# Patient Record
Sex: MALE | Race: WHITE | Employment: OTHER | URBAN - METROPOLITAN AREA
[De-identification: names, ages, dates, MRNs, and addresses within clinical notes are randomized per-mention and may not be internally consistent; named-entity substitution may affect disease eponyms.]

---

## 2017-01-17 PROBLEM — E66.9 OBESITY: Status: ACTIVE | Noted: 2017-01-17

## 2017-01-17 PROBLEM — I21.11 ST ELEVATION MYOCARDIAL INFARCTION INVOLVING RIGHT CORONARY ARTERY (HCC): Status: ACTIVE | Noted: 2017-01-17

## 2017-01-17 PROBLEM — I25.119 CORONARY ARTERY DISEASE INVOLVING NATIVE CORONARY ARTERY OF NATIVE HEART WITH ANGINA PECTORIS (HCC): Status: ACTIVE | Noted: 2017-01-17

## 2017-01-17 PROBLEM — I24.9 ACUTE CORONARY SYNDROME (HCC): Status: ACTIVE | Noted: 2017-01-17

## 2017-01-18 ENCOUNTER — TELEPHONE (OUTPATIENT)
Dept: CARDIOLOGY CLINIC | Age: 62
End: 2017-01-18

## 2017-01-20 ENCOUNTER — TELEPHONE (OUTPATIENT)
Dept: CARDIOLOGY CLINIC | Age: 62
End: 2017-01-20

## 2017-11-01 ENCOUNTER — NURSE TRIAGE (OUTPATIENT)
Dept: ADMINISTRATIVE | Age: 62
End: 2017-11-01

## 2019-08-22 ENCOUNTER — HOSPITAL ENCOUNTER (OUTPATIENT)
Dept: VASCULAR LAB | Age: 64
Discharge: HOME | End: 2019-08-22
Payer: MEDICARE

## 2019-08-22 PROCEDURE — 93978 VASCULAR STUDY: CPT

## 2020-08-13 ENCOUNTER — HOSPITAL ENCOUNTER (OUTPATIENT)
Dept: ULTRASOUND IMAGING | Age: 65
Discharge: HOME | End: 2020-08-13
Payer: MEDICAID

## 2020-08-13 PROCEDURE — 76775 US EXAM ABDO BACK WALL LIM: CPT

## 2020-08-29 ENCOUNTER — HOSPITAL ENCOUNTER (OUTPATIENT)
Age: 65
Discharge: HOME | End: 2020-08-29
Payer: MEDICARE

## 2020-08-29 ENCOUNTER — HOSPITAL ENCOUNTER (OUTPATIENT)
Dept: CT IMAGING | Age: 65
Discharge: HOME | End: 2020-08-29
Payer: MEDICARE

## 2020-08-29 LAB
ANION GAP SERPL CALCULATED.3IONS-SCNC: 11 MMOL/L (ref 3–16)
BUN BLDV-MCNC: 24 MG/DL (ref 7–20)
CALCIUM SERPL-MCNC: 10 MG/DL (ref 8.3–10.6)
CHLORIDE BLD-SCNC: 102 MMOL/L (ref 99–110)
CO2: 24 MMOL/L (ref 21–32)
CREAT SERPL-MCNC: 1.2 MG/DL (ref 0.8–1.3)
GFR AFRICAN AMERICAN: >60
GFR NON-AFRICAN AMERICAN: >60
GLUCOSE BLD-MCNC: 143 MG/DL (ref 70–99)
POTASSIUM SERPL-SCNC: 4.3 MMOL/L (ref 3.5–5.1)
SODIUM BLD-SCNC: 137 MMOL/L (ref 136–145)

## 2020-08-29 PROCEDURE — 80048 BASIC METABOLIC PNL TOTAL CA: CPT

## 2020-08-29 PROCEDURE — 74174 CTA ABD&PLVS W/CONTRAST: CPT

## 2020-08-29 PROCEDURE — 36415 COLL VENOUS BLD VENIPUNCTURE: CPT

## 2020-08-29 PROCEDURE — 6360000004 HC RX CONTRAST MEDICATION: Performed by: INTERNAL MEDICINE

## 2020-08-29 RX ADMIN — IOPAMIDOL 85 ML: 755 INJECTION, SOLUTION INTRAVENOUS at 09:48

## 2020-09-21 ENCOUNTER — TELEMEDICINE (OUTPATIENT)
Dept: FAMILY MEDICINE CLINIC | Age: 65
End: 2020-09-21
Payer: MEDICARE

## 2020-09-21 PROBLEM — J44.9 CHRONIC OBSTRUCTIVE PULMONARY DISEASE (HCC): Status: ACTIVE | Noted: 2020-09-21

## 2020-09-21 PROBLEM — I10 ESSENTIAL HYPERTENSION: Status: ACTIVE | Noted: 2020-09-21

## 2020-09-21 PROBLEM — E55.9 VITAMIN D DEFICIENCY: Status: ACTIVE | Noted: 2020-09-21

## 2020-09-21 PROBLEM — I21.11 ST ELEVATION MYOCARDIAL INFARCTION INVOLVING RIGHT CORONARY ARTERY (HCC): Status: RESOLVED | Noted: 2017-01-17 | Resolved: 2020-09-21

## 2020-09-21 PROBLEM — I50.9 CHRONIC CONGESTIVE HEART FAILURE (HCC): Status: ACTIVE | Noted: 2020-09-21

## 2020-09-21 PROBLEM — E11.9 TYPE 2 DIABETES MELLITUS WITHOUT COMPLICATION, WITHOUT LONG-TERM CURRENT USE OF INSULIN (HCC): Status: ACTIVE | Noted: 2020-09-21

## 2020-09-21 PROBLEM — I71.40 ABDOMINAL AORTIC ANEURYSM (AAA) WITHOUT RUPTURE (HCC): Status: ACTIVE | Noted: 2020-09-21

## 2020-09-21 PROBLEM — E78.2 MIXED HYPERLIPIDEMIA: Status: ACTIVE | Noted: 2020-09-21

## 2020-09-21 PROCEDURE — 99203 OFFICE O/P NEW LOW 30 MIN: CPT | Performed by: NURSE PRACTITIONER

## 2020-09-21 RX ORDER — ERGOCALCIFEROL 1.25 MG/1
50000 CAPSULE ORAL WEEKLY
COMMUNITY

## 2020-09-21 RX ORDER — LISINOPRIL 10 MG/1
10 TABLET ORAL DAILY
COMMUNITY

## 2020-09-21 RX ORDER — RIVAROXABAN 2.5 MG/1
2.5 TABLET, FILM COATED ORAL 2 TIMES DAILY
COMMUNITY

## 2020-09-21 RX ORDER — SPIRONOLACTONE 25 MG/1
12.5 TABLET ORAL DAILY
COMMUNITY

## 2020-09-21 RX ORDER — LANCETS 30 GAUGE
1 EACH MISCELLANEOUS DAILY
COMMUNITY

## 2020-09-21 RX ORDER — LOVASTATIN 40 MG/1
20 TABLET ORAL NIGHTLY
COMMUNITY

## 2020-09-21 ASSESSMENT — PATIENT HEALTH QUESTIONNAIRE - PHQ9
2. FEELING DOWN, DEPRESSED OR HOPELESS: 0
SUM OF ALL RESPONSES TO PHQ QUESTIONS 1-9: 0
SUM OF ALL RESPONSES TO PHQ QUESTIONS 1-9: 0
1. LITTLE INTEREST OR PLEASURE IN DOING THINGS: 0
SUM OF ALL RESPONSES TO PHQ9 QUESTIONS 1 & 2: 0

## 2020-09-21 ASSESSMENT — ENCOUNTER SYMPTOMS
VOMITING: 0
COUGH: 0
NAUSEA: 0
SHORTNESS OF BREATH: 0
BACK PAIN: 1
DIARRHEA: 0

## 2020-09-21 NOTE — PROGRESS NOTES
route daily Yes Historical Provider, MD   spironolactone (ALDACTONE) 25 MG tablet Take 12.5 mg by mouth daily Yes Historical Provider, MD   lovastatin (MEVACOR) 40 MG tablet Take 20 mg by mouth nightly Yes Historical Provider, MD   rivaroxaban (XARELTO) 2.5 MG TABS tablet Take 2.5 mg by mouth 2 times daily Yes Historical Provider, MD   aspirin 81 MG chewable tablet Take 1 tablet by mouth daily Yes [place] MD Nina   metoprolol succinate (TOPROL XL) 25 MG extended release tablet Take 1 tablet by mouth daily  Patient taking differently: Take 50 mg by mouth 2 times daily  Yes [place] MD Nina   magnesium oxide (MAG-OX) 400 MG tablet Take 400 mg by mouth daily.  Yes Historical Provider, MD       Social History     Tobacco Use    Smoking status: Current Every Day Smoker     Packs/day: 1.00     Years: 52.00     Pack years: 52.00     Types: Cigarettes    Smokeless tobacco: Never Used   Substance Use Topics    Alcohol use: No    Drug use: No        Allergies   Allergen Reactions    Clopidogrel Bisulfate Other (See Comments)    Niacin Er Itching   ,   Past Medical History:   Diagnosis Date    [facility]congestive heart failure) [facility]     COPD [facility]chronic obstructive pulmonary disease) [facility]     Coronary artery disease involving native coronary artery of native heart with angina pectoris ([facility] 1/17/2017    Diabetes mellitus ([facility]     diet controlled    Hyperlipidemia     Hypertension     Obesity 1/17/2017    [facility]     ST elevation myocardial infarction involving right coronary artery ([facility] 1/17/2017    Tobacco abuse     Ventricular arrhythmia    ,   Past Surgical History:   Procedure Laterality Date    CORONARY ANGIOPLASTY WITH STENT PLACEMENT  2017       PHYSICAL EXAMINATION:  [ INSTRUCTIONS:  \"[x]\" Indicates a positive item  \"[]\" Indicates a negative item  -- DELETE ALL ITEMS NOT EXAMINED]  Vital Signs: (As obtained by patient/caregiver or practitioner observation)    Blood pressure-  Heart rate-    Respiratory rate-    Temperature-  Pulse oximetry-     Constitutional: [x] Appears well-developed and well-nourished [x] No apparent distress      [] Abnormal-   Mental status  [x] Alert and awake  [x] Oriented to person/place/time [x]Able to follow commands      Eyes:  EOM    [x]  Normal  [] Abnormal-  Sclera  [x]  Normal  [] Abnormal -         Discharge []  None visible  [] Abnormal -    HENT:   [x] Normocephalic, atraumatic. [] Abnormal   [] Mouth/Throat: Mucous membranes are moist.     External Ears [] Normal  [] Abnormal-     Neck: [] No visualized mass     Pulmonary/Chest: [x] Respiratory effort normal.  [x] No visualized signs of difficulty breathing or respiratory distress        [] Abnormal-      Musculoskeletal:   [] Normal gait with no signs of ataxia         [] Normal range of motion of neck        [] Abnormal-       Neurological:        [x] No Facial Asymmetry (Cranial nerve 7 motor function) (limited exam to video visit)          [] No gaze palsy        [] Abnormal-         Skin:        [x] No significant exanthematous lesions or discoloration noted on facial skin         [] Abnormal-            Psychiatric:       [x] Normal Affect [] No Hallucinations        [] Abnormal-     Other pertinent observable physical exam findings-     ASSESSMENT/PLAN:  1. Abdominal aortic aneurysm (AAA) without rupture ([address]- refer to vascular  - eLvon Lozano MD, Vascular Surgery, [facility]    2. Type 2 diabetes mellitus without complication, without long-term current use of insulin ([address]- controlled, a1c 6.7 last week. 3. Essential hypertension- reports controlled    4. Mixed hyperlipidemia- on Lovastatin, LDL 89  5. Coronary artery disease involving native coronary artery of native heart with angina pectoris [facility])- follows with cardiology Dr. Jeni Cardenas. Arthuries CP. 6. Chronic obstructive pulmonary disease, unspecified COPD type ([address]- reports controlled    7.  Chronic congestive heart failure, unspecified heart failure type ([facility]- monitors fluid intake, on spironolactone    8. Vitamin D deficiency- on supplemetnt    9. Tobacco abuse- considering quitting, not ready currently. Reviewed risks of smoking and benefits of smoking cessation. Ceci Tatum is a 77 y.o. male being evaluated by a Virtual Visit (video visit) encounter to address concerns as mentioned above. A caregiver was present when appropriate. Due to this being a TeleHealth encounter (During YENEF-69 public health emergency), evaluation of the following organ systems was limited: Vitals/Constitutional/EENT/Resp/CV/GI//MS/Neuro/Skin/Heme-Lymph-Imm. Pursuant to the emergency declaration under the [address] [place] [address], [address] [place] [address] authority and the [employer] Act, this Virtual Visit was conducted with patient's (and/or legal guardian's) consent, to reduce the patient's risk of exposure to COVID-19 and provide necessary medical care. The patient (and/or legal guardian) has also been advised to contact this office for worsening conditions or problems, and seek emergency medical treatment and/or call 911 if deemed necessary. Patient identification was verified at the start of the visit: Yes    Total time spent on this encounter: 30 minutes    Services were provided through a video synchronous discussion virtually to substitute for in-person clinic visit. Patient and provider were located at their individual homes. --ILSA Lowe - CNP on 9/21/2020 at 8:16 AM    An electronic signature was used to authenticate this note.

## 2020-09-21 NOTE — LETTER
[address]  Phone: [phone]  Fax: [phone]    ILSA Cook CNP        September 21, 2020     Patient: Jean Pierre Barron   YOB: 1955   Date of Visit: 9/21/2020       To Whom it May Concern:    Vida Corona was seen in my clinic virtually on 9/21/2020. If you have any questions or concerns, please don't hesitate to call.     Sincerely,         ILSA Cook CNP

## 2020-09-25 ENCOUNTER — OFFICE VISIT (OUTPATIENT)
Dept: VASCULAR SURGERY | Age: 65
End: 2020-09-25
Payer: MEDICARE

## 2020-09-25 VITALS
HEIGHT: 72 IN | SYSTOLIC BLOOD PRESSURE: 130 MMHG | DIASTOLIC BLOOD PRESSURE: 80 MMHG | WEIGHT: 219 LBS | BODY MASS INDEX: 29.66 KG/M2

## 2020-09-25 PROCEDURE — 99203 OFFICE O/P NEW LOW 30 MIN: CPT | Performed by: SURGERY

## 2020-09-25 NOTE — PROGRESS NOTES
Historical Provider, MD   lisinopril (PRINIVIL;ZESTRIL) 10 MG tablet Take 5 mg by mouth daily   Yes Historical Provider, MD   Lancets MISC 1 each by Does not apply route daily   Yes Historical Provider, MD   spironolactone (ALDACTONE) 25 MG tablet Take 12.5 mg by mouth daily   Yes Historical Provider, MD   lovastatin (MEVACOR) 40 MG tablet Take 20 mg by mouth nightly   Yes Historical Provider, MD   rivaroxaban (XARELTO) 2.5 MG TABS tablet Take 2.5 mg by mouth 2 times daily   Yes Historical Provider, MD   aspirin 81 MG chewable tablet Take 1 tablet by mouth daily 1/18/17  Yes Carmelita Alberto MD   metoprolol succinate (TOPROL XL) 25 MG extended release tablet Take 1 tablet by mouth daily  Patient taking differently: Take 50 mg by mouth 2 times daily  1/18/17  Yes Carmelita Alberto MD   magnesium oxide (MAG-OX) 400 MG tablet Take 400 mg by mouth daily. Yes Historical Provider, MD        Allergies:  Clopidogrel bisulfate and Niacin er      Social History:      Social History     Socioeconomic History    Marital status:       Spouse name: Not on file    Number of children: Not on file    Years of education: Not on file    Highest education level: Not on file   Occupational History    Not on file   Social Needs    Financial resource strain: Not on file    Food insecurity     Worry: Not on file     Inability: Not on file    Transportation needs     Medical: Not on file     Non-medical: Not on file   Tobacco Use    Smoking status: Current Every Day Smoker     Packs/day: 1.00     Years: 52.00     Pack years: 52.00     Types: Cigarettes    Smokeless tobacco: Never Used   Substance and Sexual Activity    Alcohol use: No    Drug use: No    Sexual activity: Not on file   Lifestyle    Physical activity     Days per week: Not on file     Minutes per session: Not on file    Stress: Not on file   Relationships    Social connections     Talks on phone: Not on file     Gets together: Not on file     Attends Christian service: Not on file     Active member of club or organization: Not on file     Attends meetings of clubs or organizations: Not on file     Relationship status: Not on file    Intimate partner violence     Fear of current or ex partner: Not on file     Emotionally abused: Not on file     Physically abused: Not on file     Forced sexual activity: Not on file   Other Topics Concern    Not on file   Social History Narrative    Not on file       Family History:        Problem Relation Age of Onset    Breast Cancer Mother     Diabetes Father     Heart Disease Father     High Blood Pressure Father     Kidney Disease Sister     Cancer Brother         bone       Review of Systems:  A 14 point review of systems was completed. Pertinent positives identified in the HPI, all other review of systems negative. Physical Examination:    /80 (Site: Left Upper Arm)   Ht 6' (1.829 m)   Wt 219 lb (99.3 kg)   BMI 29.70 kg/m²     Weight: 219 lb (99.3 kg)       General appearance: NAD  Eyes: PERRLA  Neck: no JVD, no lymphadenopathy. Respiratory: effort is unlabored, no crackles, wheezes or rubs. Cardiovascular: regular, no murmur. No carotid bruits. No edema or varicosities. Abdominal aorta:non palpable  Pulses:    femoral PT   RIGHT 2 2   LEFT 2 2   GI: abdomen soft, nondistended, no organomegaly. Musculoskeletal: strength and tone normal.  Extremities: warm and pink. Skin: no dermatitis or ulceration. Neuro/psychiatric: grossly intact. MEDICAL DECISION MAKING/TESTING        CT: personally reviewed. 5.9cm AAA. Assessment:      Diagnosis Orders   1.  Abdominal aortic aneurysm (AAA) without rupture (HCC)       Candidate for endovascular repair    Recommendations/Plan:  I have  discussed all risks (including but not limited to death, myocardial infarction, hemorrhage, infection, stroke, endoleak, subsequeent aneurysm rupture and renal, mesenteric, lower extremity and spinal cord ischemia), benefits and alternatives to endovascular aneurysm repair. I have specifically emphasized the need for sustained aortic imaging studies for endoleak surveilance. He understands and freely consents. All questions and expectations have been addressed.       Sheri Murphy MD, FACS

## 2020-09-29 ENCOUNTER — PREP FOR PROCEDURE (OUTPATIENT)
Dept: VASCULAR SURGERY | Age: 65
End: 2020-09-29

## 2020-09-30 NOTE — PROGRESS NOTES
Obstructive Sleep Apnea (VICKI) Screening     Patient:  Mikal Meckel    YOB: 1955      Medical Record #:  [id number]                     Date:  9/30/2020     1. Are you a loud and/or regular snorer? []  Yes       [x] No    2. Have you been observed to gasp or stop breathing during sleep? []  Yes       [x] No    3. Do you feel tired or groggy upon awakening or do you awaken with a headache?           []  Yes       [x] No    4. Are you often tired or fatigued during the wake time hours? []  Yes       [x] No    5. Do you fall asleep sitting, reading, watching TV or driving? []  Yes       [x] No    6. Do you often have problems with memory or concentration? []  Yes       [x] No    **If patient's score is ? 3 they are considered high risk for VICKI. An Anesthesia provider will evaluate the patient and develop a plan of care the day of surgery. Note:  If the patient's BMI is more than 35 kg m¯² , has neck circumference > 40 cm, and/or high blood pressure the risk is greater (© American Sleep Apnea Association, 2006).

## 2020-09-30 NOTE — PROGRESS NOTES
Preoperative Screening for Elective Surgery/Invasive Procedures While COVID-19 present in the community     Have you had any of the following symptoms? NONE  o Fever, chills  o Cough  o Shortness of breath  o Muscle aches/pain  o Diarrhea  o Abdominal pain, nausea, vomiting  o Loss or decrease in taste and / or smell   Risk of Exposure  o Have you recently been hospitalized for COVID-19 or flu-like illness, if so when? NO  o Recently diagnosed with COVID-19, if so when? NO  o Recently tested for COVID-19, if so when? NO  o Have you been in close contact with a person or family member who currently has or recently had [phone]? If yes, when and in what context? NO  o Do you live with anybody who in the last 14 days has had fever, chills, shortness of breath, muscle aches, flu-like illness? NO  o Do you have any close contacts or family members who are currently in the hospital for COVID-19 or flu-like illness? If yes, assess recent close contact with this person. NO    Indicate if the patient has a positive screen by answering yes to one or more of the above questions. Patients who test positive or screen positive prior to surgery or on the day of surgery should be evaluated in conjunction with the surgeon/proceduralist/anesthesiologist to determine the urgency of the procedure.

## 2020-10-01 ENCOUNTER — TELEPHONE (OUTPATIENT)
Dept: VASCULAR SURGERY | Age: 65
End: 2020-10-01

## 2020-10-01 NOTE — TELEPHONE ENCOUNTER
Called patient regarding surgery for 9/8/20. Just going over what discussed in office. Patient to arrive at 5;30am for 7:30am surgery. Reminded him to hold Xarelto 48 hours prior to surgery. NPO after midnight.  can

## 2020-10-02 ENCOUNTER — HOSPITAL ENCOUNTER (OUTPATIENT)
Dept: PREADMISSION TESTING | Age: 65
Discharge: HOME | End: 2020-10-06
Payer: MEDICARE

## 2020-10-02 ENCOUNTER — OFFICE VISIT (OUTPATIENT)
Dept: PRIMARY CARE CLINIC | Age: 65
End: 2020-10-02

## 2020-10-02 LAB
ABO/RH: NORMAL
ANION GAP SERPL CALCULATED.3IONS-SCNC: 11 MMOL/L (ref 3–16)
ANTIBODY SCREEN: NORMAL
BASOPHILS ABSOLUTE: 0.1 K/UL (ref 0–0.2)
BASOPHILS RELATIVE PERCENT: 0.6 %
BUN BLDV-MCNC: 21 MG/DL (ref 7–20)
CALCIUM SERPL-MCNC: 9.8 MG/DL (ref 8.3–10.6)
CHLORIDE BLD-SCNC: 101 MMOL/L (ref 99–110)
CO2: 25 MMOL/L (ref 21–32)
CREAT SERPL-MCNC: 1 MG/DL (ref 0.8–1.3)
EKG ATRIAL RATE: 75 BPM
EKG DIAGNOSIS: NORMAL
EKG P AXIS: 7 DEGREES
EKG P-R INTERVAL: 162 MS
EKG Q-T INTERVAL: 406 MS
EKG QRS DURATION: 142 MS
EKG QTC CALCULATION (BAZETT): 453 MS
EKG R AXIS: 244 DEGREES
EKG T AXIS: 3 DEGREES
EKG VENTRICULAR RATE: 75 BPM
EOSINOPHILS ABSOLUTE: 0.3 K/UL (ref 0–0.6)
EOSINOPHILS RELATIVE PERCENT: 2.7 %
GFR AFRICAN AMERICAN: >60
GFR NON-AFRICAN AMERICAN: >60
GLUCOSE BLD-MCNC: 155 MG/DL (ref 70–99)
HCT VFR BLD CALC: 53.1 % (ref 40.5–52.5)
HEMOGLOBIN: 18 G/DL (ref 13.5–17.5)
LYMPHOCYTES ABSOLUTE: 2.7 K/UL (ref 1–5.1)
LYMPHOCYTES RELATIVE PERCENT: 23.1 %
MCH RBC QN AUTO: 29.6 PG (ref 26–34)
MCHC RBC AUTO-ENTMCNC: 34 G/DL (ref 31–36)
MCV RBC AUTO: 87.2 FL (ref 80–100)
MONOCYTES ABSOLUTE: 0.9 K/UL (ref 0–1.3)
MONOCYTES RELATIVE PERCENT: 7.4 %
NEUTROPHILS ABSOLUTE: 7.8 K/UL (ref 1.7–7.7)
NEUTROPHILS RELATIVE PERCENT: 66.2 %
PDW BLD-RTO: 15.4 % (ref 12.4–15.4)
PLATELET # BLD: 237 K/UL (ref 135–450)
PMV BLD AUTO: 8.8 FL (ref 5–10.5)
POTASSIUM SERPL-SCNC: 4.6 MMOL/L (ref 3.5–5.1)
RBC # BLD: 6.09 M/UL (ref 4.2–5.9)
SODIUM BLD-SCNC: 137 MMOL/L (ref 136–145)
WBC # BLD: 11.8 K/UL (ref 4–11)

## 2020-10-02 PROCEDURE — 86900 BLOOD TYPING SEROLOGIC ABO: CPT

## 2020-10-02 PROCEDURE — 36415 COLL VENOUS BLD VENIPUNCTURE: CPT

## 2020-10-02 PROCEDURE — 85025 COMPLETE CBC W/AUTO DIFF WBC: CPT

## 2020-10-02 PROCEDURE — 86850 RBC ANTIBODY SCREEN: CPT

## 2020-10-02 PROCEDURE — 99999 PR OFFICE/OUTPT VISIT,PROCEDURE ONLY: CPT | Performed by: NURSE PRACTITIONER

## 2020-10-02 PROCEDURE — 80048 BASIC METABOLIC PNL TOTAL CA: CPT

## 2020-10-02 PROCEDURE — 86901 BLOOD TYPING SEROLOGIC RH(D): CPT

## 2020-10-02 NOTE — PROGRESS NOTES
Kavon Farr received a viral test for COVID-19. They were educated on isolation and quarantine as appropriate. For any symptoms, they were directed to seek care from their PCP, given contact information to establish with a doctor, directed to an urgent care or the emergency room.

## 2020-10-02 NOTE — PATIENT INSTRUCTIONS

## 2020-10-03 LAB — SARS-COV-2, NAA: NOT DETECTED

## 2020-10-06 ENCOUNTER — ANESTHESIA EVENT (OUTPATIENT)
Dept: OPERATING ROOM | Age: 65
DRG: 269 | End: 2020-10-06
Payer: MEDICARE

## 2020-10-07 ENCOUNTER — TELEPHONE (OUTPATIENT)
Dept: VASCULAR SURGERY | Age: 65
End: 2020-10-07

## 2020-10-07 ASSESSMENT — LIFESTYLE VARIABLES: SMOKING_STATUS: 1

## 2020-10-07 NOTE — ANESTHESIA PRE PROCEDURE
Department of Anesthesiology  Preprocedure Note       Name:  Aldo Macdonald   Age:  77 y.o.  :  1955                                          MRN:  [id number]         Date:  10/8/2020      Surgeon: Janet Nava MD    Procedure: ENDOVASCULAR ABDOMINAL AORTIC ANEURYSM REPAIR WITH GORE ENDOGRAFT    HPI:  77 y.o. male who  has a PMHx of CAD, T2DM, HTN, AAA, and HF. He has a known AAA diagnosed several years ago at 4.7cm. He denies any new abdominal or back pain. Recent ultrasound showed increase in size. A CTA was performed confirming increase to greater than 5.5cm. He does have a history of CAD , S/P PCI with RCA stenting and repeat stenting 2017. He denies any CP or SOB. Seen by Cardiology recently with no interventions or further testing planne    EK-OCT-2020  Normal sinus rhythm; Right bundle branch block; Inferior infarct (cited on or before 2017); Anterolateral infarct (cited on or before 2017). When compared with ECG of 2017: Premature ventricular complexes are no longer Present; Right bundle branch block is now Present    Cardiac Cath: 2017   1.  70% mid RCA stenosis that appeared to be the culprit vessel which is confirmed by fractional flow reserve. There is also moderate disease in the  left coronary circulation. There were previously placed stents in the circ and the right coronary artery that were free of any in-stent restenosis. 2.  Normal left ventricular function, EF 60%. 3.  Slightly depressed hemodynamics.     Medications prior to admission:    vitamin D (ERGOCALCIFEROL) 1.25 MG (98882 UT) Take 50,000 Units by mouth once a week   lisinopril (PRINIVIL;ZESTRIL) 10 MG tablet Take 10 mg by mouth daily    spironolactone (ALDACTONE) 25 MG tablet Take 12.5 mg by mouth daily   lovastatin (MEVACOR) 40 MG tablet Take 20 mg by mouth nightly   rivaroxaban (XARELTO) 2.5 MG TABS tablet Take 2.5 mg by mouth 2 times daily   aspirin 81 MG chewable tablet Take 1 tablet by mouth daily   metoprolol succinate (TOPROL XL) 25 MG Take 50 mg by mouth 2 times daily 10/2/20 per pt daughter - pt taking Metoprolol tartrate (ok per cardiologist per daughter)   magnesium oxide (MAG-OX) 400 MG tablet Take 400 mg by mouth 2 times daily      Allergies:     Clopidogrel Bisulfate Other (See Comments)    Niacin Er Itching     Problem List:     [facility] coronary syndrome ([facility]    Coronary artery disease involving native coronary artery of native heart with angina pectoris ([facility]    Obesity    Type 2 diabetes mellitus without complication, without long-term current use of insulin ([facility]    Abdominal aortic aneurysm (AAA) without rupture ([facility]    Chronic congestive heart failure ([facility]    Chronic obstructive pulmonary disease ([facility]    Vitamin D deficiency    Mixed hyperlipidemia    Essential hypertension     Past Medical History:     AAA (abdominal aortic aneurysm) ([facility]     4.7 cm    CHF (congestive heart failure) ([facility]     CKD (chronic kidney disease)     COPD (chronic obstructive pulmonary disease) ([facility]     Coronary artery disease involving native coronary artery of native heart with angina pectoris ([address] 1/17/2017    Diabetes mellitus ([facility]     diet controlled    Hyperlipidemia     Hypertension     ILD (interstitial lung disease) ([facility]     Obesity 1/17/2017    Peripheral vascular disease ([facility]     ST elevation myocardial infarction involving right coronary artery ([facility] 1/17/2017    Tobacco abuse     Ventricular arrhythmia     Vitamin D deficiency      Past Surgical History:     CORONARY ANGIOPLASTY WITH STENT PLACEMENT  2017     Social History:    Tobacco Use    Smoking status: Current Every Day Smoker     Packs/day: 1.00     Years: 52.00     Pack years: 52.00     Types: Cigarettes    Smokeless tobacco: Never Used   Substance Use Topics    Alcohol use: No     Vital Signs (Current):    BP: 137/80  Pulse: 72    Resp: 16  SpO2: 99    Temp: 97.2 °F (36.2 °C)    Height: 6' (1.829 m)  (10/08/20)  Weight: 217 lb 11.2 oz (98.7 kg)  (10/08/20)    BMI: 29.6            BP Readings from Last 3 Encounters:   09/25/20 130/80   01/18/17 (!) 147/87   01/17/17 95/65     NPO Status: >8 hrs                         BMI:   Wt Readings from Last 3 Encounters:   09/25/20 219 lb (99.3 kg)   01/17/17 229 lb 4.5 oz (104 kg)   01/17/17 230 lb (104.3 kg)       CBC:    WBC 11.8 10/02/2020    HGB 18.0 10/02/2020    HCT 53.1 10/02/2020     10/02/2020     CMP:     10/02/2020    K 4.6 10/02/2020     10/02/2020    CO2 25 10/02/2020    BUN 21 10/02/2020    CREATININE 1.0 10/02/2020    GLUCOSE 155 10/02/2020     COVID-19 Screening (If Applicable):   Component Value Date    COVID19 NOT DETECTED 10/02/2020     Anesthesia Evaluation  Patient summary reviewed and Nursing notes reviewed no history of anesthetic complications:   Airway: Mallampati: II  TM distance: >3 FB   Neck ROM: full  Comment: Full heavy beard  Mouth opening: > = 3 FB Dental:          Pulmonary: breath sounds clear to auscultation  (+) COPD: moderate,  current smoker    (-) asthma, sleep apnea and wheezes                           Cardiovascular:  Exercise tolerance: good (>4 METS),   (+) hypertension:, past MI: > 6 months, CAD: obstructive, CABG/stent: no interval change, BECKMAN: after ambulating 1 flight of stairs, hyperlipidemia    (-)  angina and murmur    ECG reviewed  Rhythm: regular  Rate: normal                    Neuro/Psych:      (-) seizures, TIA and CVA           GI/Hepatic/Renal:   (+) GERD: well controlled,      (-) hepatitis, liver disease and no renal disease       Endo/Other:    (+) DiabetesType II DM, , .    (-) hypothyroidism, arthritis               Abdominal:           Vascular:   + PVD, aortic or cerebral, .  - DVT and PE.       ROS comment: See HPI. Anesthesia Plan      general     ASA 4       Induction: intravenous. arterial line  MIPS: Prophylactic antiemetics administered.   Anesthetic plan and risks discussed with patient. Plan discussed with CRNA.             Linward Bence, MD

## 2020-10-08 ENCOUNTER — HOSPITAL ENCOUNTER (INPATIENT)
Age: 65
LOS: 1 days | Discharge: HOME | DRG: 269 | End: 2020-10-09
Attending: SURGERY | Admitting: SURGERY
Payer: MEDICARE

## 2020-10-08 ENCOUNTER — ANESTHESIA (OUTPATIENT)
Dept: OPERATING ROOM | Age: 65
DRG: 269 | End: 2020-10-08
Payer: MEDICARE

## 2020-10-08 VITALS
TEMPERATURE: 98.1 F | RESPIRATION RATE: 1 BRPM | DIASTOLIC BLOOD PRESSURE: 83 MMHG | SYSTOLIC BLOOD PRESSURE: 117 MMHG | OXYGEN SATURATION: 96 %

## 2020-10-08 PROBLEM — I71.40 AAA (ABDOMINAL AORTIC ANEURYSM): Status: ACTIVE | Noted: 2020-10-08

## 2020-10-08 LAB
ABO/RH: NORMAL
ANION GAP SERPL CALCULATED.3IONS-SCNC: 11 MMOL/L (ref 3–16)
ANTIBODY SCREEN: NORMAL
APTT: 39.5 SEC (ref 24.2–36.2)
BUN BLDV-MCNC: 23 MG/DL (ref 7–20)
CALCIUM SERPL-MCNC: 9.4 MG/DL (ref 8.3–10.6)
CHLORIDE BLD-SCNC: 102 MMOL/L (ref 99–110)
CO2: 23 MMOL/L (ref 21–32)
CREAT SERPL-MCNC: 1 MG/DL (ref 0.8–1.3)
EKG ATRIAL RATE: 69 BPM
EKG DIAGNOSIS: NORMAL
EKG P AXIS: 62 DEGREES
EKG P-R INTERVAL: 180 MS
EKG Q-T INTERVAL: 418 MS
EKG QRS DURATION: 148 MS
EKG QTC CALCULATION (BAZETT): 447 MS
EKG R AXIS: 250 DEGREES
EKG T AXIS: 2 DEGREES
EKG VENTRICULAR RATE: 69 BPM
GFR AFRICAN AMERICAN: >60
GFR NON-AFRICAN AMERICAN: >60
GLUCOSE BLD-MCNC: 105 MG/DL (ref 70–99)
GLUCOSE BLD-MCNC: 140 MG/DL (ref 70–99)
GLUCOSE BLD-MCNC: 151 MG/DL (ref 70–99)
GLUCOSE BLD-MCNC: 159 MG/DL (ref 70–99)
GLUCOSE BLD-MCNC: 166 MG/DL (ref 70–99)
GLUCOSE BLD-MCNC: 184 MG/DL (ref 70–99)
INR BLD: 1.03 (ref 0.86–1.14)
MAGNESIUM: 2 MG/DL (ref 1.8–2.4)
PERFORMED ON: ABNORMAL
POC ACT LR: 240 SEC
POC ACT LR: 249 SEC
POC ACT LR: 270 SEC
POTASSIUM REFLEX MAGNESIUM: 4.5 MMOL/L (ref 3.5–5.1)
PROTHROMBIN TIME: 12 SEC (ref 10–13.2)
SODIUM BLD-SCNC: 136 MMOL/L (ref 136–145)

## 2020-10-08 PROCEDURE — 6360000002 HC RX W HCPCS: Performed by: NURSE ANESTHETIST, CERTIFIED REGISTERED

## 2020-10-08 PROCEDURE — 04V03DZ RESTRICTION OF ABDOMINAL AORTA WITH INTRALUMINAL DEVICE, PERCUTANEOUS APPROACH: ICD-10-PCS | Performed by: SURGERY

## 2020-10-08 PROCEDURE — 6360000002 HC RX W HCPCS: Performed by: SURGERY

## 2020-10-08 PROCEDURE — 7100000001 HC PACU RECOVERY - ADDTL 15 MIN: Performed by: SURGERY

## 2020-10-08 PROCEDURE — 85347 COAGULATION TIME ACTIVATED: CPT

## 2020-10-08 PROCEDURE — 34705 EVAC RPR A-BIILIAC NDGFT: CPT | Performed by: SURGERY

## 2020-10-08 PROCEDURE — 36415 COLL VENOUS BLD VENIPUNCTURE: CPT

## 2020-10-08 PROCEDURE — 3700000000 HC ANESTHESIA ATTENDED CARE: Performed by: SURGERY

## 2020-10-08 PROCEDURE — 86900 BLOOD TYPING SEROLOGIC ABO: CPT

## 2020-10-08 PROCEDURE — 3600000017 HC SURGERY HYBRID ADDL 15MIN: Performed by: SURGERY

## 2020-10-08 PROCEDURE — 7100000000 HC PACU RECOVERY - FIRST 15 MIN: Performed by: SURGERY

## 2020-10-08 PROCEDURE — 2060000000 HC ICU INTERMEDIATE R&B

## 2020-10-08 PROCEDURE — 85730 THROMBOPLASTIN TIME PARTIAL: CPT

## 2020-10-08 PROCEDURE — 6360000002 HC RX W HCPCS: Performed by: ANESTHESIOLOGY

## 2020-10-08 PROCEDURE — 2709999900 HC NON-CHARGEABLE SUPPLY: Performed by: SURGERY

## 2020-10-08 PROCEDURE — 2500000003 HC RX 250 WO HCPCS: Performed by: NURSE ANESTHETIST, CERTIFIED REGISTERED

## 2020-10-08 PROCEDURE — 93010 ELECTROCARDIOGRAM REPORT: CPT | Performed by: INTERNAL MEDICINE

## 2020-10-08 PROCEDURE — 3600000007 HC SURGERY HYBRID BASE: Performed by: SURGERY

## 2020-10-08 PROCEDURE — C1769 GUIDE WIRE: HCPCS | Performed by: SURGERY

## 2020-10-08 PROCEDURE — 6360000004 HC RX CONTRAST MEDICATION: Performed by: SURGERY

## 2020-10-08 PROCEDURE — 3700000001 HC ADD 15 MINUTES (ANESTHESIA): Performed by: SURGERY

## 2020-10-08 PROCEDURE — 86901 BLOOD TYPING SEROLOGIC RH(D): CPT

## 2020-10-08 PROCEDURE — 6370000000 HC RX 637 (ALT 250 FOR IP): Performed by: SURGERY

## 2020-10-08 PROCEDURE — 93005 ELECTROCARDIOGRAM TRACING: CPT | Performed by: SURGERY

## 2020-10-08 PROCEDURE — 2580000003 HC RX 258: Performed by: SURGERY

## 2020-10-08 PROCEDURE — C1894 INTRO/SHEATH, NON-LASER: HCPCS | Performed by: SURGERY

## 2020-10-08 PROCEDURE — 83735 ASSAY OF MAGNESIUM: CPT

## 2020-10-08 PROCEDURE — B41F1ZZ FLUOROSCOPY OF RIGHT LOWER EXTREMITY ARTERIES USING LOW OSMOLAR CONTRAST: ICD-10-PCS | Performed by: SURGERY

## 2020-10-08 PROCEDURE — C1760 CLOSURE DEV, VASC: HCPCS | Performed by: SURGERY

## 2020-10-08 PROCEDURE — 80048 BASIC METABOLIC PNL TOTAL CA: CPT

## 2020-10-08 PROCEDURE — 86850 RBC ANTIBODY SCREEN: CPT

## 2020-10-08 PROCEDURE — 2780000010 HC IMPLANT OTHER: Performed by: SURGERY

## 2020-10-08 PROCEDURE — 2580000003 HC RX 258: Performed by: ANESTHESIOLOGY

## 2020-10-08 PROCEDURE — 2580000003 HC RX 258: Performed by: NURSE ANESTHETIST, CERTIFIED REGISTERED

## 2020-10-08 PROCEDURE — C2628 CATHETER, OCCLUSION: HCPCS | Performed by: SURGERY

## 2020-10-08 PROCEDURE — 85610 PROTHROMBIN TIME: CPT

## 2020-10-08 PROCEDURE — C1887 CATHETER, GUIDING: HCPCS | Performed by: SURGERY

## 2020-10-08 PROCEDURE — 34713 PERQ ACCESS & CLSR FEM ART: CPT | Performed by: SURGERY

## 2020-10-08 DEVICE — IMPLANTABLE DEVICE: Type: IMPLANTABLE DEVICE | Site: ABDOMEN | Status: FUNCTIONAL

## 2020-10-08 DEVICE — GRAFT ENDOPROS L14CM DST DIA12MM CONTRALATERAL LEG W/ C3: Type: IMPLANTABLE DEVICE | Site: ABDOMEN | Status: FUNCTIONAL

## 2020-10-08 DEVICE — PERCLOSE PROGLIDE™ SUTURE-MEDIATED CLOSURE SYSTEM
Type: IMPLANTABLE DEVICE | Site: ABDOMEN | Status: FUNCTIONAL
Brand: PERCLOSE PROGLIDE™

## 2020-10-08 DEVICE — GRAFT EVAR L13.5CM DST DIA20MM FEP NIT CONTRALATERAL LEG IL: Type: IMPLANTABLE DEVICE | Site: ABDOMEN | Status: FUNCTIONAL

## 2020-10-08 RX ORDER — HYDROCODONE BITARTRATE AND ACETAMINOPHEN 5; 325 MG/1; MG/1
1 TABLET ORAL EVERY 4 HOURS PRN
Status: DISCONTINUED | OUTPATIENT
Start: 2020-10-08 | End: 2020-10-09 | Stop reason: HOSPADM

## 2020-10-08 RX ORDER — HYDRALAZINE HYDROCHLORIDE 20 MG/ML
5 INJECTION INTRAMUSCULAR; INTRAVENOUS EVERY 10 MIN PRN
Status: DISCONTINUED | OUTPATIENT
Start: 2020-10-08 | End: 2020-10-08

## 2020-10-08 RX ORDER — ATORVASTATIN CALCIUM 10 MG/1
10 TABLET, FILM COATED ORAL DAILY
Status: DISCONTINUED | OUTPATIENT
Start: 2020-10-08 | End: 2020-10-09 | Stop reason: HOSPADM

## 2020-10-08 RX ORDER — LIDOCAINE HYDROCHLORIDE 10 MG/ML
2 INJECTION, SOLUTION INFILTRATION; PERINEURAL
Status: DISCONTINUED | OUTPATIENT
Start: 2020-10-08 | End: 2020-10-08

## 2020-10-08 RX ORDER — LIDOCAINE HYDROCHLORIDE 20 MG/ML
INJECTION, SOLUTION INFILTRATION; PERINEURAL PRN
Status: DISCONTINUED | OUTPATIENT
Start: 2020-10-08 | End: 2020-10-08 | Stop reason: SDUPTHER

## 2020-10-08 RX ORDER — ACETAMINOPHEN 325 MG/1
650 TABLET ORAL EVERY 4 HOURS PRN
Status: DISCONTINUED | OUTPATIENT
Start: 2020-10-08 | End: 2020-10-09 | Stop reason: HOSPADM

## 2020-10-08 RX ORDER — SODIUM CHLORIDE 9 MG/ML
INJECTION, SOLUTION INTRAVENOUS CONTINUOUS
Status: DISCONTINUED | OUTPATIENT
Start: 2020-10-08 | End: 2020-10-09 | Stop reason: HOSPADM

## 2020-10-08 RX ORDER — METOPROLOL TARTRATE 50 MG/1
50 TABLET, FILM COATED ORAL 2 TIMES DAILY
COMMUNITY

## 2020-10-08 RX ORDER — HYDRALAZINE HYDROCHLORIDE 20 MG/ML
10 INJECTION INTRAMUSCULAR; INTRAVENOUS
Status: DISCONTINUED | OUTPATIENT
Start: 2020-10-08 | End: 2020-10-09 | Stop reason: HOSPADM

## 2020-10-08 RX ORDER — OXYCODONE HYDROCHLORIDE AND ACETAMINOPHEN 5; 325 MG/1; MG/1
2 TABLET ORAL PRN
Status: DISCONTINUED | OUTPATIENT
Start: 2020-10-08 | End: 2020-10-08

## 2020-10-08 RX ORDER — SPIRONOLACTONE 25 MG/1
12.5 TABLET ORAL DAILY
Status: DISCONTINUED | OUTPATIENT
Start: 2020-10-08 | End: 2020-10-09 | Stop reason: HOSPADM

## 2020-10-08 RX ORDER — SODIUM CHLORIDE 0.9 % (FLUSH) 0.9 %
10 SYRINGE (ML) INJECTION PRN
Status: DISCONTINUED | OUTPATIENT
Start: 2020-10-08 | End: 2020-10-09 | Stop reason: HOSPADM

## 2020-10-08 RX ORDER — MAGNESIUM HYDROXIDE/ALUMINUM HYDROXICE/SIMETHICONE 120; 1200; 1200 MG/30ML; MG/30ML; MG/30ML
15 SUSPENSION ORAL EVERY 6 HOURS PRN
Status: DISCONTINUED | OUTPATIENT
Start: 2020-10-08 | End: 2020-10-09 | Stop reason: HOSPADM

## 2020-10-08 RX ORDER — LABETALOL HYDROCHLORIDE 5 MG/ML
10 INJECTION, SOLUTION INTRAVENOUS
Status: DISCONTINUED | OUTPATIENT
Start: 2020-10-08 | End: 2020-10-09 | Stop reason: HOSPADM

## 2020-10-08 RX ORDER — ASPIRIN 81 MG/1
81 TABLET, CHEWABLE ORAL DAILY
Status: DISCONTINUED | OUTPATIENT
Start: 2020-10-08 | End: 2020-10-09 | Stop reason: HOSPADM

## 2020-10-08 RX ORDER — OXYCODONE HYDROCHLORIDE AND ACETAMINOPHEN 5; 325 MG/1; MG/1
1 TABLET ORAL PRN
Status: DISCONTINUED | OUTPATIENT
Start: 2020-10-08 | End: 2020-10-08

## 2020-10-08 RX ORDER — DEXAMETHASONE SODIUM PHOSPHATE 4 MG/ML
INJECTION, SOLUTION INTRA-ARTICULAR; INTRALESIONAL; INTRAMUSCULAR; INTRAVENOUS; SOFT TISSUE PRN
Status: DISCONTINUED | OUTPATIENT
Start: 2020-10-08 | End: 2020-10-08 | Stop reason: SDUPTHER

## 2020-10-08 RX ORDER — ONDANSETRON 2 MG/ML
4 INJECTION INTRAMUSCULAR; INTRAVENOUS EVERY 6 HOURS PRN
Status: DISCONTINUED | OUTPATIENT
Start: 2020-10-08 | End: 2020-10-09 | Stop reason: HOSPADM

## 2020-10-08 RX ORDER — MORPHINE SULFATE 4 MG/ML
4 INJECTION, SOLUTION INTRAMUSCULAR; INTRAVENOUS
Status: DISCONTINUED | OUTPATIENT
Start: 2020-10-08 | End: 2020-10-09 | Stop reason: HOSPADM

## 2020-10-08 RX ORDER — FENTANYL CITRATE 50 UG/ML
25 INJECTION, SOLUTION INTRAMUSCULAR; INTRAVENOUS EVERY 5 MIN PRN
Status: DISCONTINUED | OUTPATIENT
Start: 2020-10-08 | End: 2020-10-08

## 2020-10-08 RX ORDER — DEXTROSE MONOHYDRATE 50 MG/ML
100 INJECTION, SOLUTION INTRAVENOUS PRN
Status: DISCONTINUED | OUTPATIENT
Start: 2020-10-08 | End: 2020-10-09 | Stop reason: HOSPADM

## 2020-10-08 RX ORDER — PROMETHAZINE HYDROCHLORIDE 25 MG/1
12.5 TABLET ORAL EVERY 6 HOURS PRN
Status: DISCONTINUED | OUTPATIENT
Start: 2020-10-08 | End: 2020-10-09 | Stop reason: HOSPADM

## 2020-10-08 RX ORDER — ONDANSETRON 2 MG/ML
INJECTION INTRAMUSCULAR; INTRAVENOUS PRN
Status: DISCONTINUED | OUTPATIENT
Start: 2020-10-08 | End: 2020-10-08 | Stop reason: SDUPTHER

## 2020-10-08 RX ORDER — ROCURONIUM BROMIDE 10 MG/ML
INJECTION, SOLUTION INTRAVENOUS PRN
Status: DISCONTINUED | OUTPATIENT
Start: 2020-10-08 | End: 2020-10-08 | Stop reason: SDUPTHER

## 2020-10-08 RX ORDER — NICOTINE POLACRILEX 4 MG
15 LOZENGE BUCCAL PRN
Status: DISCONTINUED | OUTPATIENT
Start: 2020-10-08 | End: 2020-10-09 | Stop reason: HOSPADM

## 2020-10-08 RX ORDER — MEPERIDINE HYDROCHLORIDE 50 MG/ML
12.5 INJECTION INTRAMUSCULAR; INTRAVENOUS; SUBCUTANEOUS EVERY 5 MIN PRN
Status: DISCONTINUED | OUTPATIENT
Start: 2020-10-08 | End: 2020-10-08

## 2020-10-08 RX ORDER — SODIUM CHLORIDE, SODIUM LACTATE, POTASSIUM CHLORIDE, CALCIUM CHLORIDE 600; 310; 30; 20 MG/100ML; MG/100ML; MG/100ML; MG/100ML
INJECTION, SOLUTION INTRAVENOUS CONTINUOUS
Status: DISCONTINUED | OUTPATIENT
Start: 2020-10-08 | End: 2020-10-08

## 2020-10-08 RX ORDER — PROPOFOL 10 MG/ML
INJECTION, EMULSION INTRAVENOUS PRN
Status: DISCONTINUED | OUTPATIENT
Start: 2020-10-08 | End: 2020-10-08 | Stop reason: SDUPTHER

## 2020-10-08 RX ORDER — HEPARIN SODIUM 1000 [USP'U]/ML
INJECTION, SOLUTION INTRAVENOUS; SUBCUTANEOUS PRN
Status: DISCONTINUED | OUTPATIENT
Start: 2020-10-08 | End: 2020-10-08 | Stop reason: SDUPTHER

## 2020-10-08 RX ORDER — LISINOPRIL 10 MG/1
10 TABLET ORAL DAILY
Status: DISCONTINUED | OUTPATIENT
Start: 2020-10-08 | End: 2020-10-09 | Stop reason: HOSPADM

## 2020-10-08 RX ORDER — MIDAZOLAM HYDROCHLORIDE 1 MG/ML
INJECTION INTRAMUSCULAR; INTRAVENOUS PRN
Status: DISCONTINUED | OUTPATIENT
Start: 2020-10-08 | End: 2020-10-08 | Stop reason: SDUPTHER

## 2020-10-08 RX ORDER — SODIUM CHLORIDE 9 MG/ML
INJECTION, SOLUTION INTRAVENOUS CONTINUOUS PRN
Status: DISCONTINUED | OUTPATIENT
Start: 2020-10-08 | End: 2020-10-08 | Stop reason: SDUPTHER

## 2020-10-08 RX ORDER — MORPHINE SULFATE 2 MG/ML
2 INJECTION, SOLUTION INTRAMUSCULAR; INTRAVENOUS
Status: DISCONTINUED | OUTPATIENT
Start: 2020-10-08 | End: 2020-10-09 | Stop reason: HOSPADM

## 2020-10-08 RX ORDER — FENTANYL CITRATE 50 UG/ML
INJECTION, SOLUTION INTRAMUSCULAR; INTRAVENOUS PRN
Status: DISCONTINUED | OUTPATIENT
Start: 2020-10-08 | End: 2020-10-08 | Stop reason: SDUPTHER

## 2020-10-08 RX ORDER — ONDANSETRON 2 MG/ML
4 INJECTION INTRAMUSCULAR; INTRAVENOUS
Status: DISCONTINUED | OUTPATIENT
Start: 2020-10-08 | End: 2020-10-08

## 2020-10-08 RX ORDER — PROTAMINE SULFATE 10 MG/ML
INJECTION, SOLUTION INTRAVENOUS PRN
Status: DISCONTINUED | OUTPATIENT
Start: 2020-10-08 | End: 2020-10-08 | Stop reason: SDUPTHER

## 2020-10-08 RX ORDER — DEXTROSE MONOHYDRATE 25 G/50ML
12.5 INJECTION, SOLUTION INTRAVENOUS PRN
Status: DISCONTINUED | OUTPATIENT
Start: 2020-10-08 | End: 2020-10-09 | Stop reason: HOSPADM

## 2020-10-08 RX ORDER — SODIUM CHLORIDE 0.9 % (FLUSH) 0.9 %
10 SYRINGE (ML) INJECTION EVERY 12 HOURS SCHEDULED
Status: DISCONTINUED | OUTPATIENT
Start: 2020-10-08 | End: 2020-10-09 | Stop reason: HOSPADM

## 2020-10-08 RX ORDER — CLONIDINE HYDROCHLORIDE 0.1 MG/1
0.1 TABLET ORAL
Status: DISCONTINUED | OUTPATIENT
Start: 2020-10-08 | End: 2020-10-09 | Stop reason: HOSPADM

## 2020-10-08 RX ORDER — PROMETHAZINE HYDROCHLORIDE 25 MG/ML
6.25 INJECTION, SOLUTION INTRAMUSCULAR; INTRAVENOUS
Status: DISCONTINUED | OUTPATIENT
Start: 2020-10-08 | End: 2020-10-08

## 2020-10-08 RX ORDER — HYDROCODONE BITARTRATE AND ACETAMINOPHEN 5; 325 MG/1; MG/1
2 TABLET ORAL EVERY 4 HOURS PRN
Status: DISCONTINUED | OUTPATIENT
Start: 2020-10-08 | End: 2020-10-09 | Stop reason: HOSPADM

## 2020-10-08 RX ADMIN — MIDAZOLAM HYDROCHLORIDE 2 MG: 2 INJECTION, SOLUTION INTRAMUSCULAR; INTRAVENOUS at 07:05

## 2020-10-08 RX ADMIN — LIDOCAINE HYDROCHLORIDE 50 MG: 20 INJECTION, SOLUTION INFILTRATION; PERINEURAL at 07:38

## 2020-10-08 RX ADMIN — PHENYLEPHRINE HYDROCHLORIDE: 10 INJECTION INTRAVENOUS at 07:50

## 2020-10-08 RX ADMIN — PROPOFOL 150 MG: 10 INJECTION, EMULSION INTRAVENOUS at 07:38

## 2020-10-08 RX ADMIN — SPIRONOLACTONE 12.5 MG: 25 TABLET ORAL at 16:09

## 2020-10-08 RX ADMIN — MAGNESIUM GLUCONATE 500 MG ORAL TABLET 400 MG: 500 TABLET ORAL at 16:09

## 2020-10-08 RX ADMIN — SUGAMMADEX 100 MG: 100 INJECTION, SOLUTION INTRAVENOUS at 09:33

## 2020-10-08 RX ADMIN — LISINOPRIL 10 MG: 10 TABLET ORAL at 16:10

## 2020-10-08 RX ADMIN — SODIUM CHLORIDE: 9 INJECTION, SOLUTION INTRAVENOUS at 12:53

## 2020-10-08 RX ADMIN — FENTANYL CITRATE 50 MCG: 50 INJECTION, SOLUTION INTRAMUSCULAR; INTRAVENOUS at 08:30

## 2020-10-08 RX ADMIN — SODIUM CHLORIDE: 9 INJECTION, SOLUTION INTRAVENOUS at 07:33

## 2020-10-08 RX ADMIN — HYDROMORPHONE HYDROCHLORIDE 0.5 MG: 1 INJECTION, SOLUTION INTRAMUSCULAR; INTRAVENOUS; SUBCUTANEOUS at 11:35

## 2020-10-08 RX ADMIN — ROCURONIUM BROMIDE 50 MG: 10 SOLUTION INTRAVENOUS at 08:22

## 2020-10-08 RX ADMIN — CEFAZOLIN 2 G: 10 INJECTION, POWDER, FOR SOLUTION INTRAVENOUS at 07:30

## 2020-10-08 RX ADMIN — ONDANSETRON 4 MG: 2 INJECTION INTRAMUSCULAR; INTRAVENOUS at 07:48

## 2020-10-08 RX ADMIN — DEXAMETHASONE SODIUM PHOSPHATE 8 MG: 4 INJECTION, SOLUTION INTRAMUSCULAR; INTRAVENOUS at 07:48

## 2020-10-08 RX ADMIN — FENTANYL CITRATE 150 MCG: 50 INJECTION, SOLUTION INTRAMUSCULAR; INTRAVENOUS at 07:38

## 2020-10-08 RX ADMIN — SODIUM CHLORIDE, SODIUM LACTATE, POTASSIUM CHLORIDE, AND CALCIUM CHLORIDE: .6; .31; .03; .02 INJECTION, SOLUTION INTRAVENOUS at 06:40

## 2020-10-08 RX ADMIN — SUGAMMADEX 300 MG: 100 INJECTION, SOLUTION INTRAVENOUS at 09:29

## 2020-10-08 RX ADMIN — PROTAMINE SULFATE 50 MG: 10 INJECTION, SOLUTION INTRAVENOUS at 09:15

## 2020-10-08 RX ADMIN — Medication 10 ML: at 20:27

## 2020-10-08 RX ADMIN — ATORVASTATIN CALCIUM 10 MG: 10 TABLET, FILM COATED ORAL at 16:10

## 2020-10-08 RX ADMIN — HEPARIN SODIUM 9000 UNITS: 1000 INJECTION, SOLUTION INTRAVENOUS; SUBCUTANEOUS at 08:20

## 2020-10-08 RX ADMIN — ROCURONIUM BROMIDE 50 MG: 10 SOLUTION INTRAVENOUS at 07:38

## 2020-10-08 RX ADMIN — MAGNESIUM GLUCONATE 500 MG ORAL TABLET 400 MG: 500 TABLET ORAL at 20:27

## 2020-10-08 RX ADMIN — FENTANYL CITRATE 50 MCG: 50 INJECTION, SOLUTION INTRAMUSCULAR; INTRAVENOUS at 09:03

## 2020-10-08 RX ADMIN — PROPOFOL 30 MG: 10 INJECTION, EMULSION INTRAVENOUS at 08:21

## 2020-10-08 RX ADMIN — METOPROLOL TARTRATE 50 MG: 25 TABLET, FILM COATED ORAL at 20:27

## 2020-10-08 RX ADMIN — PROTAMINE SULFATE 50 MG: 10 INJECTION, SOLUTION INTRAVENOUS at 09:11

## 2020-10-08 ASSESSMENT — PULMONARY FUNCTION TESTS
PIF_VALUE: 24
PIF_VALUE: 27
PIF_VALUE: 28
PIF_VALUE: 23
PIF_VALUE: 1
PIF_VALUE: 24
PIF_VALUE: 26
PIF_VALUE: 2
PIF_VALUE: 22
PIF_VALUE: 23
PIF_VALUE: 27
PIF_VALUE: 24
PIF_VALUE: 27
PIF_VALUE: 24
PIF_VALUE: 1
PIF_VALUE: 2
PIF_VALUE: 24
PIF_VALUE: 23
PIF_VALUE: 27
PIF_VALUE: 1
PIF_VALUE: 23
PIF_VALUE: 23
PIF_VALUE: 26
PIF_VALUE: 24
PIF_VALUE: 21
PIF_VALUE: 23
PIF_VALUE: 29
PIF_VALUE: 23
PIF_VALUE: 23
PIF_VALUE: 1
PIF_VALUE: 27
PIF_VALUE: 1
PIF_VALUE: 26
PIF_VALUE: 3
PIF_VALUE: 23
PIF_VALUE: 24
PIF_VALUE: 24
PIF_VALUE: 28
PIF_VALUE: 23
PIF_VALUE: 27
PIF_VALUE: 11
PIF_VALUE: 29
PIF_VALUE: 24
PIF_VALUE: 0
PIF_VALUE: 27
PIF_VALUE: 24
PIF_VALUE: 27
PIF_VALUE: 23
PIF_VALUE: 23
PIF_VALUE: 26
PIF_VALUE: 27
PIF_VALUE: 26
PIF_VALUE: 23
PIF_VALUE: 21
PIF_VALUE: 23
PIF_VALUE: 21
PIF_VALUE: 23
PIF_VALUE: 28
PIF_VALUE: 28
PIF_VALUE: 4
PIF_VALUE: 4
PIF_VALUE: 3
PIF_VALUE: 23
PIF_VALUE: 23
PIF_VALUE: 27
PIF_VALUE: 27
PIF_VALUE: 29
PIF_VALUE: 24
PIF_VALUE: 26
PIF_VALUE: 21
PIF_VALUE: 24
PIF_VALUE: 23
PIF_VALUE: 27
PIF_VALUE: 23
PIF_VALUE: 24
PIF_VALUE: 23
PIF_VALUE: 28
PIF_VALUE: 26
PIF_VALUE: 23
PIF_VALUE: 23
PIF_VALUE: 25
PIF_VALUE: 23
PIF_VALUE: 1
PIF_VALUE: 23
PIF_VALUE: 24
PIF_VALUE: 22
PIF_VALUE: 5
PIF_VALUE: 28
PIF_VALUE: 23
PIF_VALUE: 26
PIF_VALUE: 24
PIF_VALUE: 24
PIF_VALUE: 23
PIF_VALUE: 6
PIF_VALUE: 23
PIF_VALUE: 23
PIF_VALUE: 26
PIF_VALUE: 24
PIF_VALUE: 28
PIF_VALUE: 23
PIF_VALUE: 24
PIF_VALUE: 24
PIF_VALUE: 26
PIF_VALUE: 27
PIF_VALUE: 22
PIF_VALUE: 23
PIF_VALUE: 23
PIF_VALUE: 24
PIF_VALUE: 28
PIF_VALUE: 20
PIF_VALUE: 22
PIF_VALUE: 27
PIF_VALUE: 23
PIF_VALUE: 23
PIF_VALUE: 1
PIF_VALUE: 26
PIF_VALUE: 27
PIF_VALUE: 9
PIF_VALUE: 26
PIF_VALUE: 24
PIF_VALUE: 26
PIF_VALUE: 22
PIF_VALUE: 22
PIF_VALUE: 23
PIF_VALUE: 1
PIF_VALUE: 26
PIF_VALUE: 1

## 2020-10-08 ASSESSMENT — ENCOUNTER SYMPTOMS: DYSPNEA ACTIVITY LEVEL: AFTER AMBULATING 1 FLIGHT OF STAIRS

## 2020-10-08 ASSESSMENT — COPD QUESTIONNAIRES: CAT_SEVERITY: MODERATE

## 2020-10-08 ASSESSMENT — PAIN SCALES - GENERAL: PAINLEVEL_OUTOF10: 7

## 2020-10-08 ASSESSMENT — PAIN - FUNCTIONAL ASSESSMENT: PAIN_FUNCTIONAL_ASSESSMENT: 0-10

## 2020-10-08 NOTE — ADDENDUM NOTE
Addendum  created 10/08/20 1630 by ILSA Upton CRNA    Intraprocedure LDAs edited, LDA properties accepted

## 2020-10-08 NOTE — PROGRESS NOTES
Pt brought to PACU. Report obtained from OR RN and anesthesia. Pt placed on monitor and O2 at 2 L pe NC. Pt drowsy but awakens to name Denies c/o at this time and quickly falls back to sleep. Fabian King

## 2020-10-08 NOTE — OP NOTE
[facility]                                OPERATIVE REPORT    PATIENT NAME: Olga Fuentes            :        1955  MED REC NO:   [id number]                          ROOM:       7961  ACCOUNT NO:   [de-identified]                           ADMIT DATE: 10/08/2020  PROVIDER:     Zac Deleon MD    DATE OF PROCEDURE:  10/08/2020    PREOPERATIVE DIAGNOSIS:  Abdominal aortic aneurysm. POSTOPERATIVE DIAGNOSIS:  Abdominal aortic aneurysm. PROCEDURE:  1. Ultrasound-guided bilateral femoral artery access. 2.  Placement of bilateral percutaneous closure devices. 3.  Endovascular repair of abdominal aortic aneurysm using an  gjazx-lo-cfxlg bifurcated device. ANESTHESIA:  General endotracheal.    INDICATIONS:  The patient is a 13-year-old male who is noted to have an  approximately 6 cm abdominal aortic aneurysm. He is brought to the  operating room at this time to undergo endovascular repair. PROCEDURE:  The patient was brought to the operating room, placed in  supine position. General endotracheal anesthesia induced. After  adequate anesthesia, the abdomen and pelvic regions were prepped and  draped in sterile fashion. Ultrasound was used to identify the common  femoral bifurcations. The common femoral arteries were noted to be  patent. Under direct visualization, the vessels were accessed using  5-[place] micropuncture sheaths. Digital copies of the ultrasound images  were saved and placed in the patient's record. Two Perclose devices were placed in standard fashion in each femoral  artery and then 8-Bahraini introducer sheaths were placed. Retrograde  left iliac angiogram was performed to assess the patency of the internal  iliac artery as there was some question whether this was occluded on the  preoperative CT scan.   This did indeed confirm that the left internal  iliac artery origin was occluded. Bentson wires were advanced into the aorta bilaterally and then  exchanged over a glide catheter for stiff Lunderquist wires. An  18-[place] Islander GORE DrySeal sheath was placed on the left femoral artery and  a 12-[place] Island[place] DrySeal sheath was placed over the Lunderquist wire in the  right femoral artery. The main body device which was a GORE AAA  endoprosthesis measuring 31 mm proximally x 14.5 mm distally x 17 cm in  length was then advanced into the aorta from the left femoral approach. Pigtail catheter was inserted into the aorta and aortogram was obtained. The device was then deployed just below the renal arteries to its first  stage deployment. The contralateral gate was then easily cannulated  from the right femoral artery. The superior portion of the main body  was then fully deployed. The retrograde right femoral angiogram was  then performed and a 20 mm x 13.5 cm iliac limb was then advanced into  position and deployed just above the iliac bifurcation on the right. The entire main body device was now completely deployed and a retrograde  left iliac angiogram was performed. As the internal iliac artery was  occluded, the landing zone was brought down to below the iliac  bifurcation into the external iliac artery. This was done using a 12 mm  diameter graft measuring 14 cm in length. This allowed sufficient  overlap with the original iliac limb. Once all components were deployed, seal zones and landing zones were  angioplastied using a GORE molding balloon. Pigtail catheter was  reinserted into the aorta. An aortogram was then performed now showing  patent bilateral renal arteries. No evidence of type I, II or III  endoleaks. There was excellent outflow through the limbs of the graft. Pigtail catheter was then removed over the stiff Lunderquist wire. Both  sheaths were then removed tying down the preplaced Perclose sutures. This achieved excellent hemostasis.   The patient had been given 9000  units of intravenous heparin at the start of this procedure. This was  now reversed with intravenous protamine. Pressure was held in each  groin for approximately 15 minutes achieving excellent hemostasis. At the conclusion of the procedure, the patient was extubated and  transferred to the recovery area in a stable and awake condition. At  the end of the procedure, all sponge, needle and instrument counts were  correct. Estimated blood loss less than 100 mL.         Avila Montes MD    D: 10/08/2020 12:17:51       T: 10/08/2020 12:25:56     MARY [email or web address]  Job#: [id number]     Doc#: [id number]    CC:

## 2020-10-08 NOTE — BRIEF OP NOTE
Brief Postoperative Note      Patient: Carmen Law  YOB: 1955  MRN: [id number]    Date of Procedure: 10/8/2020    Pre-Op Diagnosis: ABDOMINAL AORTIC ANEURYSM    Post-Op Diagnosis: Same       Procedure(s):  ENDOVASCULAR ABDOMINAL AORTIC ANEURYSM REPAIR WITH GORE ENDOGRAFT    Surgeon(s):  Beth Gusman MD    Assistant:  Surgical Assistant: Sarina Johnson    Anesthesia: General    Estimated Blood Loss (mL): less than 50     Complications: None    Specimens:   * No specimens in log *    Implants:  Implant Name Type Inv. Item Serial No.  Lot No. LRB No. Used Action   SYSTEM PERCLOSE VESSEL CLOSURE Vascular/Graft/Patch/Filter SYSTEM PERCLOSE VESSEL CLOSURE  [employer] [id number] N/A 4 Implanted   GRAFT STENT ENDO EXCLUDER IPSILAT 31X14. [id number] - [id number] Stent:Biliary/Pancreatic/Iliac GRAFT STENT ENDO EXCLUDER IPSILAT 31X14. [id number]  [employer]  N/A 1 Implanted   GRAFT STENT ENDO EXCLUDER CONTRALAT 02DZL87.5CM - [id number] Stent:Biliary/Pancreatic/Iliac GRAFT STENT ENDO EXCLUDER CONTRALAT [id number]  [employer]  N/A 1 Implanted   GRAFT STENT ENDO EXCLUDER CONTRALAT [id number] - [id number] Stent:Biliary/Pancreatic/Iliac GRAFT STENT ENDO EXCLUDER CONTRALAT [id number]  [employer]  N/A 1 Implanted         Drains:   Urethral Catheter Non-latex; Temperature probe 16 fr (Active)         Electronically signed by Beth Gusman MD on 10/8/2020 at 9:23 AM

## 2020-10-08 NOTE — H&P
I have reviewed the history and physical (See note dated 9/25/2020) and examined the patient and find no relevant changes. I have reviewed with the patient and/or family the risks, benefits, and alternatives to the procedure.

## 2020-10-08 NOTE — ANESTHESIA POSTPROCEDURE EVALUATION
Department of Anesthesiology  Postprocedure Note    Patient: Aarti Bazan  MRN: [id number]  YOB: 1955  Date of evaluation: 10/8/2020  Time:  2:31 PM     Procedure Summary     Date:  10/08/20 Room / Location:  [place] [address] (HYBRID) / [facility]    Anesthesia Start:  9277 Anesthesia Stop:  3818    Procedure:  ENDOVASCULAR ABDOMINAL AORTIC ANEURYSM REPAIR WITH GORE ENDOGRAFT (N/A Abdomen) Diagnosis:       Abdominal aortic aneurysm (AAA) without rupture ([address]      (ABDOMINAL AORTIC ANEURYSM)    Surgeon: Nazia Meng MD Responsible Provider:  Dalton Rosado MD    Anesthesia Type:  general ASA Status:  4          Anesthesia Type: general    Raad Phase I: Raad Score: 8    Raad Phase II:      Last vitals: Reviewed and per EMR flowsheets.        Anesthesia Post Evaluation    Patient location during evaluation: PACU  Level of consciousness: awake  Airway patency: patent  Nausea & Vomiting: no nausea  Complications: no  Cardiovascular status: blood pressure returned to baseline  Respiratory status: acceptable  Hydration status: euvolemic

## 2020-10-09 VITALS
DIASTOLIC BLOOD PRESSURE: 70 MMHG | RESPIRATION RATE: 16 BRPM | WEIGHT: 217.7 LBS | HEIGHT: 72 IN | OXYGEN SATURATION: 95 % | TEMPERATURE: 97.9 F | BODY MASS INDEX: 29.49 KG/M2 | SYSTOLIC BLOOD PRESSURE: 114 MMHG | HEART RATE: 60 BPM

## 2020-10-09 PROBLEM — I47.29 NSVT (NONSUSTAINED VENTRICULAR TACHYCARDIA): Status: ACTIVE | Noted: 2020-10-09

## 2020-10-09 LAB
ANION GAP SERPL CALCULATED.3IONS-SCNC: 9 MMOL/L (ref 3–16)
BUN BLDV-MCNC: 23 MG/DL (ref 7–20)
CALCIUM SERPL-MCNC: 9.3 MG/DL (ref 8.3–10.6)
CHLORIDE BLD-SCNC: 103 MMOL/L (ref 99–110)
CO2: 24 MMOL/L (ref 21–32)
CREAT SERPL-MCNC: 1.1 MG/DL (ref 0.8–1.3)
GFR AFRICAN AMERICAN: >60
GFR NON-AFRICAN AMERICAN: >60
GLUCOSE BLD-MCNC: 108 MG/DL (ref 70–99)
GLUCOSE BLD-MCNC: 124 MG/DL (ref 70–99)
GLUCOSE BLD-MCNC: 133 MG/DL (ref 70–99)
GLUCOSE BLD-MCNC: 136 MG/DL (ref 70–99)
HCT VFR BLD CALC: 46.1 % (ref 40.5–52.5)
HEMOGLOBIN: 15.6 G/DL (ref 13.5–17.5)
LV EF: 58 %
LVEF MODALITY: NORMAL
MCH RBC QN AUTO: 29.3 PG (ref 26–34)
MCHC RBC AUTO-ENTMCNC: 33.9 G/DL (ref 31–36)
MCV RBC AUTO: 86.5 FL (ref 80–100)
PDW BLD-RTO: 15 % (ref 12.4–15.4)
PERFORMED ON: ABNORMAL
PLATELET # BLD: 178 K/UL (ref 135–450)
PMV BLD AUTO: 8.2 FL (ref 5–10.5)
POTASSIUM REFLEX MAGNESIUM: 5 MMOL/L (ref 3.5–5.1)
RBC # BLD: 5.33 M/UL (ref 4.2–5.9)
SODIUM BLD-SCNC: 136 MMOL/L (ref 136–145)
WBC # BLD: 20.7 K/UL (ref 4–11)

## 2020-10-09 PROCEDURE — 99223 1ST HOSP IP/OBS HIGH 75: CPT | Performed by: INTERNAL MEDICINE

## 2020-10-09 PROCEDURE — 80048 BASIC METABOLIC PNL TOTAL CA: CPT

## 2020-10-09 PROCEDURE — C8929 TTE W OR WO FOL WCON,DOPPLER: HCPCS

## 2020-10-09 PROCEDURE — 36415 COLL VENOUS BLD VENIPUNCTURE: CPT

## 2020-10-09 PROCEDURE — 6370000000 HC RX 637 (ALT 250 FOR IP): Performed by: SURGERY

## 2020-10-09 PROCEDURE — 2580000003 HC RX 258: Performed by: SURGERY

## 2020-10-09 PROCEDURE — 85027 COMPLETE CBC AUTOMATED: CPT

## 2020-10-09 RX ADMIN — MAGNESIUM GLUCONATE 500 MG ORAL TABLET 400 MG: 500 TABLET ORAL at 08:24

## 2020-10-09 RX ADMIN — Medication 10 ML: at 08:24

## 2020-10-09 RX ADMIN — RIVAROXABAN 2.5 MG: 2.5 TABLET, FILM COATED ORAL at 08:30

## 2020-10-09 RX ADMIN — SPIRONOLACTONE 12.5 MG: 25 TABLET ORAL at 08:24

## 2020-10-09 RX ADMIN — ATORVASTATIN CALCIUM 10 MG: 10 TABLET, FILM COATED ORAL at 08:24

## 2020-10-09 RX ADMIN — ASPIRIN 81 MG: 81 TABLET, CHEWABLE ORAL at 08:22

## 2020-10-09 RX ADMIN — LISINOPRIL 10 MG: 10 TABLET ORAL at 08:24

## 2020-10-09 ASSESSMENT — PAIN SCALES - GENERAL
PAINLEVEL_OUTOF10: 0

## 2020-10-09 NOTE — CARE COORDINATION
Discharge order noted. Spoke with RN who states patient is independent at home and has no needs from CM.

## 2020-10-09 NOTE — PROGRESS NOTES
Notified Glenn Lerner, nurse with [facility], @ [phone] 10/9/20 for cardiac consult. -[phone] [facility]

## 2020-10-09 NOTE — PROGRESS NOTES
Phone call placed to MD Dee Gamble in regards to patient's runs of v-tach x2. MD Dee Gamble requesting EP consult.

## 2020-10-09 NOTE — PROGRESS NOTES
Vascular Surgery Progress Note      SUBJECTIVE:  No c/o. Denies pain. OBJECTIVE    Physical  CURRENT VITALS:  /60   Pulse 54   Temp 98 °F (36.7 °C)   Resp 16   Ht 6' (1.829 m)   Wt 217 lb 11.2 oz (98.7 kg)   SpO2 96%   BMI 29.53 kg/m²   24 HR INTAKE/OUTPUT:    Intake/Output Summary (Last 24 hours) at 10/9/2020 0828  Last data filed at 10/9/2020 4833  Gross per 24 hour   Intake 1970 ml   Output 300 ml   Net 1670 ml     Abdomen soft non tender  Groin access site dressings dry. No hematoma or bleeding. Plap R DP and L PT pulses. Data  CBC:   Lab Results   Component Value Date    WBC 20.7 10/09/2020    RBC 5.33 10/09/2020    HGB 15.6 10/09/2020    HCT 46.1 10/09/2020    MCV 86.5 10/09/2020    MCH 29.3 10/09/2020    MCHC 33.9 10/09/2020    RDW 15.0 10/09/2020     10/09/2020    MPV 8.2 10/09/2020     BMP:    Lab Results   Component Value Date     10/09/2020    K 5.0 10/09/2020     10/09/2020    CO2 24 10/09/2020    BUN 23 10/09/2020    LABALBU 4.1 01/17/2017    CREATININE 1.1 10/09/2020    CALCIUM 9.3 10/09/2020    GFRAA >60 10/09/2020    LABGLOM >60 10/09/2020    GLUCOSE 133 10/09/2020       ASSESSMENT AND PLAN    POD #1 EVAR. No pain. No access site issues. Good distal perfusion. Leukocytosis   Not unexpected. Reactive- no signs infection. Commonly seen after endograft implantation. Will f/u as oupt.      Home this am.

## 2020-10-09 NOTE — PROGRESS NOTES
Reviewed discharge instructions with patient, verbalized understanding. Also gave printed material on care for incision sites. No additional questions at this time. IV removed with no complications. Telemetry monitor removed and returned, [facility]U notified of discharge. Belongings gathered and lockbox emptied. Patient escorted out via wheelchair.

## 2020-10-09 NOTE — FLOWSHEET NOTE
10/08/20 2026   Vital Signs   Temp 97.8 °F (36.6 °C)   Pulse 65   Resp 16   /64   MAP (mmHg) 80   Level of Consciousness 0   MEWS Score 1   Shift assessment complete; see flow sheet. Scheduled medications administered; see MAR. Call light and bedside table within reach, bed in low, locked position, side rails up, pt encouraged to call for assistance with ambulation or transfer. Pt denies further needs at this time. Nurse and staff will continue to monitor throughout shift.

## 2020-10-09 NOTE — CONSULTS
[address] [place] [address]                                  CONSULTATION    PATIENT NAME: Felisa Jacobs            :        1955  MED REC NO:   [id number]                          ROOM:       7723  ACCOUNT NO:   [de-identified]                           ADMIT DATE: 10/08/2020  PROVIDER:     Marcus Merida MD    CONSULT DATE:  10/09/2020    CARDIAC ELECTROPHYSIOLOGY CONSULTATION    REASON FOR CONSULTATION:  Ventricular tachycardia. HISTORY OF PRESENT ILLNESS:  The patient is a 33-year-old man with  history of coronary artery disease and peripheral vascular disease who  is admitted on 10/07/2020 for endovascular repair of an enlarging  abdominal aortic aneurysm. This procedure was performed on   and was uncomplicated. In the postoperative period on ECG telemetry,  the patient has had numerous episodes of nonsustained ventricular  tachycardia. These have been of varying duration and rate. Of concern,  there was an episode on 10/08/2020, which was about 165 beats per minute  for 4 seconds. The patient denies any history of palpitations. He has  not had near-syncope or syncope. He has known coronary artery disease  and had an acute inferior myocardial infarction in 2017, which was  successfully treated with a mid right coronary artery stent. The  patient has had previous stenting in the posterior circulation as well. Left ventricular function at that time was well preserved. The patient  reports that he has had a recent echo at [facility], but the results of  this is unavailable in [address] [place] [address] Rd. He has not had episodes of near-syncope or  syncope. He has not had episodes of chest pain. He maintains a fairly  active lifestyle. PAST MEDICAL HISTORY:  1. Hypertension. 2.  Hyperlipidemia. 3.  Type 2 diabetes. 4.  Coronary artery disease. 5.  Peripheral vascular disease.     MEDICATIONS:  At the time of admission include aspirin 81 mg p.o. q.d.,  lisinopril 10 mg p.o. q.d., lovastatin 40 mg p.o. q.d., magnesium oxide  400 mg p.o. b.i.d., metoprolol tartrate 50 mg p.o. b.i.d., Xarelto 2.5  mg p.o. b.i.d. and Aldactone 12.5 mg p.o. q.d. ALLERGIES:  Include PLAVIX and NIACIN. SOCIAL HISTORY:  The patient is a current every day cigarette smoker. He does not consume alcohol at this time. FAMILY HISTORY:  Remarkable for breast cancer in the mother. There is  history of hypertension, diabetes, heart disease in the father. There  is no known family history of cardiac rhythm disturbance, syncope or  sudden cardiac death. REVIEW OF SYSTEMS:  Demonstrates no recent change in appetite or change  in weight. The patient has not had recent fever or chills. He does not  describe chest pain. He has not had palpitations, near-syncope or  syncope. His functional status is quite well preserved. All other  components of the 14-system review are negative. PHYSICAL EXAMINATION:  VITAL SIGNS:  Blood pressure is 119/60, heart rate is 56 beats per  minute and regular. GENERAL:  The patient is awake, alert, oriented and in no discomfort. HEENT:  Exam demonstrates normocephalic, atraumatic head. There is no  scleral icterus. Pupils are round and reactive. NECK:  Supple without thyromegaly. There is no cervical  lymphadenopathy. LUNGS:  Clear to auscultation. CARDIOVASCULAR:  Exam reveals a regular rhythm. The apical impulse is  discrete. S1 and S2 are normal.  There is no audible murmur or gallop. The jugular venous pressure does not appear elevated. ABDOMEN:  Lean, soft, nontender. EXTREMITIES:  Demonstrate no pitting pretibial dependent edema. There  is no cyanosis. There is no evidence for chronic venous stasis. SKIN:  Otherwise warm and dry without skin rash. DIAGNOSTIC DATA:  A 12-lead ECG from 10/08/2020 demonstrates sinus  rhythm at 69 beats per minute.   There is evidence of previous inferior  myocardial infarction, right bundle-branch block with absence of R-waves  throughout the anterior and lateral precordium. IMPRESSIONS:  1.  Nonsustained ventricular tachycardia. 2.  Coronary artery disease status post revascularization. 3.  Peripheral vascular disease status post endovascular repair of  abdominal aortic aneurysm. 4.  Hypertension. 5.  Hyperlipidemia. 6.  Diabetes. The patient has recurring episodes of nonsustained ventricular  tachycardia in the postoperative period following abdominal aortic  aneurysm repair. The relevance of the nonsustained ventricular  tachycardia depends on the status of his left ventricular systolic  function. He does have known coronary artery disease and underwent  stenting of the right coronary artery in 2017. His ECG would indicate  loss of electrical activity to the anterior myocardium, but he did not  have significant anterior disease on recent cardiac catheterization from  2017 and his left ventricular function at that time was well preserved. If left ventricular systolic function is poor, he would be considered at  elevated risk for sustained VT or sudden cardiac arrest and implantable  cardioverter-defibrillator might be recommended. Alternatively, if his  left ventricular function is well-preserved, he may be at lower risk for  malignant ventricular arrhythmias and ambulatory ECG monitoring may be  recommended. He believes he had an echocardiogram performed in  [facility] within the last month. RECOMMENDATIONS:  1. Recover previous echo or obtain new echo. 2.  If left ventricular function is well-preserved, ambulatory ECG  monitoring would be recommended. 3.  Ischemic evaluation including pharmacologic perfusion type stress  testing would be helpful as well. Thanks for the opportunity to assist in the care of the patient. Please  contact me, if you have any questions regarding his evaluation.         Corinne Craig MD    D: 10/09/2020 12:07:39       T: 10/09/2020 12:17:11     [email or web address]  Job#: [id number]     Doc#: [id number]    CC:

## 2020-10-09 NOTE — PROGRESS NOTES
Perfect served caridiologist on call following secure message. .. \"Patient increasingly agitated. Ready to leave AMA. Per Dr. Gallo Money note, looks like possible stress test/holter monitor pending results from todays echo, which was done this afternoon. Echo still hasn't resulted. Please advise on what to do/tell patient. He is under impression he would be leaving today. \"

## 2020-10-10 NOTE — DISCHARGE SUMMARY
times dailyHistorical Med         STOP taking these medications       OXYGEN Comments:   Reason for Stopping:         metoprolol succinate (TOPROL XL) 25 MG extended release tablet Comments:   Reason for Stopping:             Activity: activity as tolerated  Diet: cardiac diet  Wound Care: remove groin dressings in 2 days    Follow-up with Dr Kalpesh Ayala in 2 weeks. Signed:   Venkata Cortés MD  10/10/2020  10:48 AM

## 2020-10-12 ENCOUNTER — TELEPHONE (OUTPATIENT)
Dept: CARDIOLOGY CLINIC | Age: 65
End: 2020-10-12

## 2020-10-12 NOTE — TELEPHONE ENCOUNTER
Patient was seen by Dr. Jonathon Siemens. Per Dr. Cory Witt, patient wanted to go home and did not have stress test or get event monitor as recommended on Dr. Jonathon Siemens' note. Please call to arrange monitor, stress test, and office visit.

## 2020-10-13 NOTE — TELEPHONE ENCOUNTER
Stress test not ordered. Order Ceasar Lazaro? SHAHNAZ  1. Recover previous echo or obtain new echo. 2.  If left ventricular function is well-preserved, ambulatory ECG  monitoring would be recommended. 3.  Ischemic evaluation including pharmacologic perfusion type stress  testing would be helpful as well.

## 2020-10-13 NOTE — TELEPHONE ENCOUNTER
I don't know the patient and his functional status. I was relaying a message from Dr. Ciaran Grossman but will forward to Dr. Cleo Figueroa.

## 2020-10-30 ENCOUNTER — HOSPITAL ENCOUNTER (OUTPATIENT)
Dept: CARDIOLOGY | Age: 65
Discharge: HOME | End: 2020-10-30
Payer: MEDICARE

## 2020-10-30 LAB
LV EF: 34 %
LVEF MODALITY: NORMAL

## 2020-10-30 PROCEDURE — 3430000000 HC RX DIAGNOSTIC RADIOPHARMACEUTICAL: Performed by: NURSE PRACTITIONER

## 2020-10-30 PROCEDURE — 6360000002 HC RX W HCPCS: Performed by: NURSE PRACTITIONER

## 2020-10-30 PROCEDURE — A9502 TC99M TETROFOSMIN: HCPCS | Performed by: NURSE PRACTITIONER

## 2020-10-30 PROCEDURE — 93017 CV STRESS TEST TRACING ONLY: CPT

## 2020-10-30 PROCEDURE — 78452 HT MUSCLE IMAGE SPECT MULT: CPT

## 2020-10-30 RX ADMIN — REGADENOSON 0.4 MG: 0.08 INJECTION, SOLUTION INTRAVENOUS at 14:05

## 2020-10-30 RX ADMIN — TETROFOSMIN 35 MILLICURIE: 1.38 INJECTION, POWDER, LYOPHILIZED, FOR SOLUTION INTRAVENOUS at 14:05

## 2020-10-30 RX ADMIN — TETROFOSMIN 11 MILLICURIE: 1.38 INJECTION, POWDER, LYOPHILIZED, FOR SOLUTION INTRAVENOUS at 13:05

## 2020-11-03 ENCOUNTER — TELEPHONE (OUTPATIENT)
Dept: CARDIOLOGY CLINIC | Age: 65
End: 2020-11-03

## 2020-11-03 NOTE — TELEPHONE ENCOUNTER
Spoke with pt. MUGA scan ordered. Pt will call central to schedule. Pt also asked that results be sent to primary cardiologist Dr. Klio Alas. These have been sent.

## 2020-11-03 NOTE — TELEPHONE ENCOUNTER
----- Message from Marcus Merida MD sent at 11/2/2020  5:39 PM EST -----  LVEF . 54 by echo 10/9/20 and .34 with fixed myocardial scar on Myoview 10/30/20. This has significant implications for his SCA risk, especially with NSVT. Lets get a MUGA scan.

## 2023-09-26 ENCOUNTER — APPOINTMENT (OUTPATIENT)
Dept: GENERAL RADIOLOGY | Age: 68
End: 2023-09-26
Payer: MEDICARE

## 2023-09-26 ENCOUNTER — APPOINTMENT (OUTPATIENT)
Dept: CARDIAC CATH/INVASIVE PROCEDURES | Age: 68
End: 2023-09-26
Payer: MEDICARE

## 2023-09-26 ENCOUNTER — APPOINTMENT (OUTPATIENT)
Dept: CT IMAGING | Age: 68
End: 2023-09-26
Attending: INTERNAL MEDICINE
Payer: MEDICARE

## 2023-09-26 ENCOUNTER — HOSPITAL ENCOUNTER (INPATIENT)
Age: 68
LOS: 2 days | Discharge: ANOTHER HOSPITAL | End: 2023-09-28
Attending: EMERGENCY MEDICINE | Admitting: INTERNAL MEDICINE
Payer: MEDICARE

## 2023-09-26 DIAGNOSIS — R07.9 CHEST PAIN DURING PREGNANCY: ICD-10-CM

## 2023-09-26 DIAGNOSIS — I21.4 NSTEMI (NON-ST ELEVATED MYOCARDIAL INFARCTION) (HCC): Primary | ICD-10-CM

## 2023-09-26 DIAGNOSIS — O99.891 CHEST PAIN DURING PREGNANCY: ICD-10-CM

## 2023-09-26 LAB
ALBUMIN SERPL-MCNC: 4.2 G/DL (ref 3.4–5)
ALBUMIN/GLOB SERPL: 1.1 {RATIO} (ref 1.1–2.2)
ALP SERPL-CCNC: 98 U/L (ref 40–129)
ALT SERPL-CCNC: 19 U/L (ref 10–40)
ANION GAP SERPL CALCULATED.3IONS-SCNC: 10 MMOL/L (ref 3–16)
APTT BLD: 31.9 SEC (ref 22.7–35.9)
AST SERPL-CCNC: 22 U/L (ref 15–37)
BASOPHILS # BLD: 0.1 K/UL (ref 0–0.2)
BASOPHILS NFR BLD: 0.8 %
BILIRUB SERPL-MCNC: 0.4 MG/DL (ref 0–1)
BUN SERPL-MCNC: 25 MG/DL (ref 7–20)
CALCIUM SERPL-MCNC: 9.8 MG/DL (ref 8.3–10.6)
CHLORIDE SERPL-SCNC: 104 MMOL/L (ref 99–110)
CO2 SERPL-SCNC: 26 MMOL/L (ref 21–32)
CREAT SERPL-MCNC: 1.2 MG/DL (ref 0.8–1.3)
DEPRECATED RDW RBC AUTO: 16 % (ref 12.4–15.4)
EKG ATRIAL RATE: 71 BPM
EKG ATRIAL RATE: 74 BPM
EKG DIAGNOSIS: NORMAL
EKG DIAGNOSIS: NORMAL
EKG P AXIS: 43 DEGREES
EKG P AXIS: 56 DEGREES
EKG P-R INTERVAL: 186 MS
EKG P-R INTERVAL: 188 MS
EKG Q-T INTERVAL: 416 MS
EKG Q-T INTERVAL: 420 MS
EKG QRS DURATION: 152 MS
EKG QRS DURATION: 156 MS
EKG QTC CALCULATION (BAZETT): 452 MS
EKG QTC CALCULATION (BAZETT): 466 MS
EKG R AXIS: -76 DEGREES
EKG R AXIS: -76 DEGREES
EKG T AXIS: -17 DEGREES
EKG T AXIS: -18 DEGREES
EKG VENTRICULAR RATE: 71 BPM
EKG VENTRICULAR RATE: 74 BPM
EOSINOPHIL # BLD: 0.3 K/UL (ref 0–0.6)
EOSINOPHIL NFR BLD: 2.8 %
GFR SERPLBLD CREATININE-BSD FMLA CKD-EPI: >60 ML/MIN/{1.73_M2}
GLUCOSE BLD-MCNC: 204 MG/DL (ref 70–99)
GLUCOSE SERPL-MCNC: 132 MG/DL (ref 70–99)
HCT VFR BLD AUTO: 47.7 % (ref 40.5–52.5)
HGB BLD-MCNC: 16.3 G/DL (ref 13.5–17.5)
INR PPP: 1.06 (ref 0.84–1.16)
LYMPHOCYTES # BLD: 2.6 K/UL (ref 1–5.1)
LYMPHOCYTES NFR BLD: 21.9 %
MCH RBC QN AUTO: 29.4 PG (ref 26–34)
MCHC RBC AUTO-ENTMCNC: 34.2 G/DL (ref 31–36)
MCV RBC AUTO: 86 FL (ref 80–100)
MONOCYTES # BLD: 0.9 K/UL (ref 0–1.3)
MONOCYTES NFR BLD: 7.6 %
NEUTROPHILS # BLD: 7.8 K/UL (ref 1.7–7.7)
NEUTROPHILS NFR BLD: 66.9 %
PERFORMED ON: ABNORMAL
PLATELET # BLD AUTO: 283 K/UL (ref 135–450)
PMV BLD AUTO: 8.5 FL (ref 5–10.5)
POTASSIUM SERPL-SCNC: 4.1 MMOL/L (ref 3.5–5.1)
PROT SERPL-MCNC: 7.9 G/DL (ref 6.4–8.2)
PROTHROMBIN TIME: 13.8 SEC (ref 11.5–14.8)
RBC # BLD AUTO: 5.55 M/UL (ref 4.2–5.9)
SODIUM SERPL-SCNC: 140 MMOL/L (ref 136–145)
SPECIMEN STATUS: NORMAL
TROPONIN, HIGH SENSITIVITY: 49 NG/L (ref 0–22)
TROPONIN, HIGH SENSITIVITY: 52 NG/L (ref 0–22)
WBC # BLD AUTO: 11.7 K/UL (ref 4–11)

## 2023-09-26 PROCEDURE — 4A023N8 MEASUREMENT OF CARDIAC SAMPLING AND PRESSURE, BILATERAL, PERCUTANEOUS APPROACH: ICD-10-PCS | Performed by: INTERNAL MEDICINE

## 2023-09-26 PROCEDURE — 83036 HEMOGLOBIN GLYCOSYLATED A1C: CPT

## 2023-09-26 PROCEDURE — 93005 ELECTROCARDIOGRAM TRACING: CPT | Performed by: EMERGENCY MEDICINE

## 2023-09-26 PROCEDURE — 96374 THER/PROPH/DIAG INJ IV PUSH: CPT

## 2023-09-26 PROCEDURE — 85610 PROTHROMBIN TIME: CPT

## 2023-09-26 PROCEDURE — 2580000003 HC RX 258

## 2023-09-26 PROCEDURE — 80053 COMPREHEN METABOLIC PANEL: CPT

## 2023-09-26 PROCEDURE — C1769 GUIDE WIRE: HCPCS

## 2023-09-26 PROCEDURE — 2060000000 HC ICU INTERMEDIATE R&B

## 2023-09-26 PROCEDURE — 93010 ELECTROCARDIOGRAM REPORT: CPT | Performed by: INTERNAL MEDICINE

## 2023-09-26 PROCEDURE — C1887 CATHETER, GUIDING: HCPCS

## 2023-09-26 PROCEDURE — 93306 TTE W/DOPPLER COMPLETE: CPT

## 2023-09-26 PROCEDURE — 99223 1ST HOSP IP/OBS HIGH 75: CPT | Performed by: INTERNAL MEDICINE

## 2023-09-26 PROCEDURE — 85730 THROMBOPLASTIN TIME PARTIAL: CPT

## 2023-09-26 PROCEDURE — 2709999900 HC NON-CHARGEABLE SUPPLY: Performed by: INTERNAL MEDICINE

## 2023-09-26 PROCEDURE — 71046 X-RAY EXAM CHEST 2 VIEWS: CPT

## 2023-09-26 PROCEDURE — 70450 CT HEAD/BRAIN W/O DYE: CPT

## 2023-09-26 PROCEDURE — 93460 R&L HRT ART/VENTRICLE ANGIO: CPT

## 2023-09-26 PROCEDURE — 85025 COMPLETE CBC W/AUTO DIFF WBC: CPT

## 2023-09-26 PROCEDURE — 93460 R&L HRT ART/VENTRICLE ANGIO: CPT | Performed by: INTERNAL MEDICINE

## 2023-09-26 PROCEDURE — B2151ZZ FLUOROSCOPY OF LEFT HEART USING LOW OSMOLAR CONTRAST: ICD-10-PCS | Performed by: INTERNAL MEDICINE

## 2023-09-26 PROCEDURE — 6360000002 HC RX W HCPCS

## 2023-09-26 PROCEDURE — 84484 ASSAY OF TROPONIN QUANT: CPT

## 2023-09-26 PROCEDURE — B2131ZZ FLUOROSCOPY OF MULTIPLE CORONARY ARTERY BYPASS GRAFTS USING LOW OSMOLAR CONTRAST: ICD-10-PCS | Performed by: INTERNAL MEDICINE

## 2023-09-26 PROCEDURE — 2580000003 HC RX 258: Performed by: INTERNAL MEDICINE

## 2023-09-26 PROCEDURE — 6360000002 HC RX W HCPCS: Performed by: EMERGENCY MEDICINE

## 2023-09-26 PROCEDURE — 2709999900 HC NON-CHARGEABLE SUPPLY

## 2023-09-26 PROCEDURE — B2111ZZ FLUOROSCOPY OF MULTIPLE CORONARY ARTERIES USING LOW OSMOLAR CONTRAST: ICD-10-PCS | Performed by: INTERNAL MEDICINE

## 2023-09-26 PROCEDURE — 85347 COAGULATION TIME ACTIVATED: CPT

## 2023-09-26 PROCEDURE — 6370000000 HC RX 637 (ALT 250 FOR IP): Performed by: INTERNAL MEDICINE

## 2023-09-26 PROCEDURE — C1894 INTRO/SHEATH, NON-LASER: HCPCS

## 2023-09-26 PROCEDURE — 99152 MOD SED SAME PHYS/QHP 5/>YRS: CPT | Performed by: INTERNAL MEDICINE

## 2023-09-26 PROCEDURE — 2500000003 HC RX 250 WO HCPCS

## 2023-09-26 PROCEDURE — 99285 EMERGENCY DEPT VISIT HI MDM: CPT

## 2023-09-26 PROCEDURE — 6360000004 HC RX CONTRAST MEDICATION

## 2023-09-26 PROCEDURE — 93005 ELECTROCARDIOGRAM TRACING: CPT | Performed by: PHYSICIAN ASSISTANT

## 2023-09-26 RX ORDER — SPIRONOLACTONE 25 MG/1
12.5 TABLET ORAL DAILY
Status: DISCONTINUED | OUTPATIENT
Start: 2023-09-26 | End: 2023-09-28 | Stop reason: HOSPADM

## 2023-09-26 RX ORDER — ASPIRIN 325 MG
325 TABLET ORAL ONCE
Status: DISCONTINUED | OUTPATIENT
Start: 2023-09-26 | End: 2023-09-28 | Stop reason: HOSPADM

## 2023-09-26 RX ORDER — HEPARIN SODIUM 1000 [USP'U]/ML
2000 INJECTION, SOLUTION INTRAVENOUS; SUBCUTANEOUS PRN
Status: DISCONTINUED | OUTPATIENT
Start: 2023-09-26 | End: 2023-09-28 | Stop reason: HOSPADM

## 2023-09-26 RX ORDER — NICOTINE 21 MG/24HR
1 PATCH, TRANSDERMAL 24 HOURS TRANSDERMAL DAILY
Status: DISCONTINUED | OUTPATIENT
Start: 2023-09-26 | End: 2023-09-28 | Stop reason: HOSPADM

## 2023-09-26 RX ORDER — INSULIN LISPRO 100 [IU]/ML
0-4 INJECTION, SOLUTION INTRAVENOUS; SUBCUTANEOUS NIGHTLY
Status: DISCONTINUED | OUTPATIENT
Start: 2023-09-26 | End: 2023-09-28 | Stop reason: HOSPADM

## 2023-09-26 RX ORDER — ASPIRIN 81 MG/1
81 TABLET, CHEWABLE ORAL DAILY
Status: DISCONTINUED | OUTPATIENT
Start: 2023-09-26 | End: 2023-09-28 | Stop reason: HOSPADM

## 2023-09-26 RX ORDER — FENTANYL CITRATE 50 UG/ML
INJECTION, SOLUTION INTRAMUSCULAR; INTRAVENOUS
Status: COMPLETED | OUTPATIENT
Start: 2023-09-26 | End: 2023-09-26

## 2023-09-26 RX ORDER — NITROGLYCERIN 20 MG/100ML
5-200 INJECTION INTRAVENOUS CONTINUOUS
Status: DISCONTINUED | OUTPATIENT
Start: 2023-09-26 | End: 2023-09-28 | Stop reason: HOSPADM

## 2023-09-26 RX ORDER — INSULIN LISPRO 100 [IU]/ML
0-4 INJECTION, SOLUTION INTRAVENOUS; SUBCUTANEOUS
Status: DISCONTINUED | OUTPATIENT
Start: 2023-09-26 | End: 2023-09-28 | Stop reason: HOSPADM

## 2023-09-26 RX ORDER — SODIUM CHLORIDE 0.9 % (FLUSH) 0.9 %
5-40 SYRINGE (ML) INJECTION PRN
Status: DISCONTINUED | OUTPATIENT
Start: 2023-09-26 | End: 2023-09-27 | Stop reason: SDUPTHER

## 2023-09-26 RX ORDER — ONDANSETRON 2 MG/ML
4 INJECTION INTRAMUSCULAR; INTRAVENOUS EVERY 6 HOURS PRN
Status: DISCONTINUED | OUTPATIENT
Start: 2023-09-26 | End: 2023-09-28 | Stop reason: HOSPADM

## 2023-09-26 RX ORDER — HEPARIN SODIUM 1000 [USP'U]/ML
4000 INJECTION, SOLUTION INTRAVENOUS; SUBCUTANEOUS PRN
Status: DISCONTINUED | OUTPATIENT
Start: 2023-09-26 | End: 2023-09-28 | Stop reason: HOSPADM

## 2023-09-26 RX ORDER — METOPROLOL TARTRATE 50 MG/1
50 TABLET, FILM COATED ORAL 2 TIMES DAILY
Status: DISCONTINUED | OUTPATIENT
Start: 2023-09-26 | End: 2023-09-28 | Stop reason: HOSPADM

## 2023-09-26 RX ORDER — HEPARIN SODIUM 1000 [USP'U]/ML
INJECTION, SOLUTION INTRAVENOUS; SUBCUTANEOUS
Status: COMPLETED | OUTPATIENT
Start: 2023-09-26 | End: 2023-09-26

## 2023-09-26 RX ORDER — ACETAMINOPHEN 650 MG/1
650 SUPPOSITORY RECTAL EVERY 6 HOURS PRN
Status: DISCONTINUED | OUTPATIENT
Start: 2023-09-26 | End: 2023-09-28 | Stop reason: HOSPADM

## 2023-09-26 RX ORDER — SODIUM CHLORIDE 0.9 % (FLUSH) 0.9 %
5-40 SYRINGE (ML) INJECTION EVERY 12 HOURS SCHEDULED
Status: DISCONTINUED | OUTPATIENT
Start: 2023-09-26 | End: 2023-09-27 | Stop reason: SDUPTHER

## 2023-09-26 RX ORDER — MIDAZOLAM HYDROCHLORIDE 1 MG/ML
INJECTION INTRAMUSCULAR; INTRAVENOUS
Status: COMPLETED | OUTPATIENT
Start: 2023-09-26 | End: 2023-09-26

## 2023-09-26 RX ORDER — ACETAMINOPHEN 325 MG/1
650 TABLET ORAL EVERY 4 HOURS PRN
Status: DISCONTINUED | OUTPATIENT
Start: 2023-09-26 | End: 2023-09-27 | Stop reason: SDUPTHER

## 2023-09-26 RX ORDER — PROCHLORPERAZINE EDISYLATE 5 MG/ML
10 INJECTION INTRAMUSCULAR; INTRAVENOUS EVERY 6 HOURS PRN
Status: DISCONTINUED | OUTPATIENT
Start: 2023-09-26 | End: 2023-09-28 | Stop reason: HOSPADM

## 2023-09-26 RX ORDER — LANOLIN ALCOHOL/MO/W.PET/CERES
400 CREAM (GRAM) TOPICAL 2 TIMES DAILY
Status: DISCONTINUED | OUTPATIENT
Start: 2023-09-26 | End: 2023-09-28 | Stop reason: HOSPADM

## 2023-09-26 RX ORDER — ATORVASTATIN CALCIUM 80 MG/1
80 TABLET, FILM COATED ORAL NIGHTLY
Status: DISCONTINUED | OUTPATIENT
Start: 2023-09-26 | End: 2023-09-28 | Stop reason: HOSPADM

## 2023-09-26 RX ORDER — HEPARIN SODIUM 10000 [USP'U]/100ML
940 INJECTION, SOLUTION INTRAVENOUS CONTINUOUS
Status: DISCONTINUED | OUTPATIENT
Start: 2023-09-26 | End: 2023-09-26

## 2023-09-26 RX ORDER — LISINOPRIL 10 MG/1
10 TABLET ORAL DAILY
Status: DISCONTINUED | OUTPATIENT
Start: 2023-09-26 | End: 2023-09-28 | Stop reason: HOSPADM

## 2023-09-26 RX ORDER — SODIUM CHLORIDE 0.9 % (FLUSH) 0.9 %
5-40 SYRINGE (ML) INJECTION EVERY 12 HOURS SCHEDULED
Status: DISCONTINUED | OUTPATIENT
Start: 2023-09-26 | End: 2023-09-28 | Stop reason: HOSPADM

## 2023-09-26 RX ORDER — SODIUM CHLORIDE 9 MG/ML
INJECTION, SOLUTION INTRAVENOUS PRN
Status: DISCONTINUED | OUTPATIENT
Start: 2023-09-26 | End: 2023-09-27 | Stop reason: SDUPTHER

## 2023-09-26 RX ORDER — POLYETHYLENE GLYCOL 3350 17 G/17G
17 POWDER, FOR SOLUTION ORAL DAILY PRN
Status: DISCONTINUED | OUTPATIENT
Start: 2023-09-26 | End: 2023-09-28 | Stop reason: HOSPADM

## 2023-09-26 RX ORDER — SODIUM CHLORIDE 0.9 % (FLUSH) 0.9 %
5-40 SYRINGE (ML) INJECTION PRN
Status: DISCONTINUED | OUTPATIENT
Start: 2023-09-26 | End: 2023-09-28 | Stop reason: HOSPADM

## 2023-09-26 RX ORDER — HEPARIN SODIUM 1000 [USP'U]/ML
4000 INJECTION, SOLUTION INTRAVENOUS; SUBCUTANEOUS ONCE
Status: COMPLETED | OUTPATIENT
Start: 2023-09-26 | End: 2023-09-26

## 2023-09-26 RX ORDER — ACETAMINOPHEN 325 MG/1
650 TABLET ORAL EVERY 6 HOURS PRN
Status: DISCONTINUED | OUTPATIENT
Start: 2023-09-26 | End: 2023-09-28 | Stop reason: HOSPADM

## 2023-09-26 RX ORDER — DEXTROSE MONOHYDRATE 100 MG/ML
INJECTION, SOLUTION INTRAVENOUS CONTINUOUS PRN
Status: DISCONTINUED | OUTPATIENT
Start: 2023-09-26 | End: 2023-09-28 | Stop reason: HOSPADM

## 2023-09-26 RX ORDER — LORAZEPAM 0.5 MG/1
0.5 TABLET ORAL
Status: ACTIVE | OUTPATIENT
Start: 2023-09-26 | End: 2023-09-27

## 2023-09-26 RX ORDER — SODIUM CHLORIDE 9 MG/ML
INJECTION, SOLUTION INTRAVENOUS PRN
Status: DISCONTINUED | OUTPATIENT
Start: 2023-09-26 | End: 2023-09-28 | Stop reason: HOSPADM

## 2023-09-26 RX ADMIN — FENTANYL CITRATE 25 MCG: 50 INJECTION, SOLUTION INTRAMUSCULAR; INTRAVENOUS at 15:17

## 2023-09-26 RX ADMIN — HEPARIN SODIUM 4000 UNITS: 1000 INJECTION INTRAVENOUS; SUBCUTANEOUS at 09:33

## 2023-09-26 RX ADMIN — HEPARIN SODIUM 2000 UNITS: 1000 INJECTION, SOLUTION INTRAVENOUS; SUBCUTANEOUS at 16:04

## 2023-09-26 RX ADMIN — Medication 400 MG: at 20:08

## 2023-09-26 RX ADMIN — HEPARIN SODIUM 940 UNITS/HR: 10000 INJECTION, SOLUTION INTRAVENOUS at 09:35

## 2023-09-26 RX ADMIN — FENTANYL CITRATE 25 MCG: 50 INJECTION, SOLUTION INTRAMUSCULAR; INTRAVENOUS at 16:01

## 2023-09-26 RX ADMIN — NITROGLYCERIN 10 MCG/MIN: 20 INJECTION INTRAVENOUS at 08:12

## 2023-09-26 RX ADMIN — MIDAZOLAM HYDROCHLORIDE 1 MG: 1 INJECTION INTRAMUSCULAR; INTRAVENOUS at 16:01

## 2023-09-26 RX ADMIN — MIDAZOLAM HYDROCHLORIDE 1 MG: 1 INJECTION INTRAMUSCULAR; INTRAVENOUS at 15:16

## 2023-09-26 RX ADMIN — ATORVASTATIN CALCIUM 80 MG: 80 TABLET, FILM COATED ORAL at 20:08

## 2023-09-26 RX ADMIN — METOPROLOL TARTRATE 50 MG: 50 TABLET, FILM COATED ORAL at 21:49

## 2023-09-26 RX ADMIN — Medication 10 ML: at 20:08

## 2023-09-26 ASSESSMENT — PAIN DESCRIPTION - LOCATION: LOCATION: CHEST

## 2023-09-26 ASSESSMENT — ENCOUNTER SYMPTOMS
DIARRHEA: 0
NAUSEA: 0
SHORTNESS OF BREATH: 0
VOMITING: 0
COUGH: 0

## 2023-09-26 ASSESSMENT — PAIN - FUNCTIONAL ASSESSMENT
PAIN_FUNCTIONAL_ASSESSMENT: 0-10
PAIN_FUNCTIONAL_ASSESSMENT: 0-10

## 2023-09-26 ASSESSMENT — PAIN DESCRIPTION - PAIN TYPE: TYPE: ACUTE PAIN

## 2023-09-26 ASSESSMENT — PAIN SCALES - GENERAL
PAINLEVEL_OUTOF10: 0
PAINLEVEL_OUTOF10: 2

## 2023-09-26 NOTE — ED NOTES
@9127 called Cariology for consult per Dr. Xavier Esteves  Cardiology came down and seen pt and spoke to Dr. Cesar Avila  09/26/23 2694

## 2023-09-26 NOTE — ED PROVIDER NOTES
Emergency Department Attending Physician Note  Location: [facility]  ED  9/26/2023       Pt Name: Willene Mcburney  MRN: [id number]  [facility] 1955    Date of evaluation: 9/26/2023  Provider: Jam Ruff DO  PCP: Ronnie Mott    Note Started: 7:53 AM EDT 9/26/23    CHIEF COMPLAINT  Chief Complaint   Patient presents with    Chest Pain     Chest pain x 4 days        HISTORY OF PRESENT ILLNESS:  History obtained by patient. Limitations to history : None. Willene Mcburney is a 77 y.o. male with a significant PMHx of CAD with 4 stents status post STEMI in 2017, AAA, CHF, CKD, COPD, and other comorbidities as listed below, presenting emerged department today with concerns of chest pain that woke him up from sleep this morning that was worse, but actually says has been having some chest pain over the past 4 days. EMS had transmitted an EKG concerning for STEMI, but history of right bundle branch block, does have some T wave changes in the 3 through V6, though on arrival STEMI not activated. Patient says his pain is pretty much under control after he got nitroglycerin. Otherwise, no other concerns today including headache or vision changes. Did not fall. Says the pain is worse with exertion. Has a history of 4 stents, and says this feels like previous heart attack. No back pain, nausea, abdominal pain, numbness, weakness, tingling. Nursing Notes were all reviewed and agreed with or any disagreements were addressed in the HPI.       MEDICAL HISTORY  Past Medical History:   Diagnosis Date    AAA (abdominal aortic aneurysm) (HCC)     4.7 cm    CHF (congestive heart failure) (HCC)     CKD (chronic kidney disease)     COPD (chronic obstructive pulmonary disease) (HCC)     Coronary artery disease involving native coronary artery of native heart with angina pectoris ([address]) 1/17/2017    Diabetes mellitus ([address])     diet controlled    Hyperlipidemia     Hypertension

## 2023-09-26 NOTE — PROGRESS NOTES
4 Eyes Skin Assessment     NAME:  Chacha Hauser  YOB: 1955  MEDICAL RECORD NUMBER:  [id number]    The patient is being assessed for  Admission    I agree that at least one RN has performed a thorough Head to Toe Skin Assessment on the patient. ALL assessment sites listed below have been assessed. Areas assessed by both nurses:    Head, Face, Ears, Shoulders, Back, Chest, Arms, Elbows, Hands, Sacrum. Buttock, Coccyx, Ischium, Legs. Feet and Heels, Under Medical Devices , and Other ***        Does the Patient have a Wound?  No noted wound(s)       Jarek Prevention initiated by RN: No  Wound Care Orders initiated by RN: No    Pressure Injury (Stage 3,4, Unstageable, DTI, NWPT, and Complex wounds) if present, place Wound referral order by RN under : No    New Ostomies, if present place, Ostomy referral order under : No     Nurse 1 eSignature: Electronically signed by Wolfgang Goodman RN on 9/26/23 at 6:03 PM EDT    **SHARE this note so that the co-signing nurse can place an eSignature**    Nurse 2 eSignature: {Esignature:[id number]}

## 2023-09-26 NOTE — CONSULTS
Pharmacy to manage Heparin - contact for questions. Heparin 60 units/kg IV x 1 (max 4,000 units), then 12 units/kg/hr (recommended max initial rate 1,000 units/hr). Adjust infusion rate based off aPTT results below. aPTT < 59       Heparin 60 units/kg bolus  Increase infusion by 4 units/kg/hr  aPTT 59 - 72.9    Heparin 30 units/kg bolus       Increase infusion by 2 units/kg/hr  aPTT 73 - 102       No bolus       No change   aPTT 102.1 - 109    No bolus       Decrease infusion by 1 units/kg/hr  aPTT 109.1 - 122.9    No bolus       Decrease infusion by 2 units/kg/hr  aPTT 123 or greater   Hold heparin for 1 hour       Decrease infusion by 3 units/kg/hr    Obtain aPTT 6 hours after bolus and 6 hours after any dose change until two consecutive therapeutic aPTT are achieved- then daily. Initiating heparin with 4,000 unit bolus, and rate of 940 units/hr. aPTT ordered baseline and every 6 hours. Patient has history of rivaroxaban .  Next aPTT 9/26 1400    Iban Isaacs PharmD 9/26/2023  8:15 AM

## 2023-09-26 NOTE — PROCEDURES
CARDIAC CATHETERIZATION REPORT     Procedure Date:  2023  Patient Name: Elroy Caldwell  MRN: [id number] : 1955      INDICATION     nstemi    PROCEDURES PERFORMED     Right heart cath  Left heart catheterization  LVgram  Coronary angiogam  Coronary cath  Monitoring of moderate conscious sedation        PROCEDURE DESCRIPTION   Risks/benefits/alternatives/outcomes were discussed with patient and/or family and informed consent was obtained. Using the [facility] scale, the patient's right radial artery was found to be a level B. Patient was prepped draped in the usual sterile fashion. Local anaesthetic was applied over puncture sites. Prior to the procedure, right peripheral antecubital IV was placed and this was exchanged using micropuncture kit for a 5 [place] sheath. 5 [place] PA catheter was used with [place] wire to perform right heart catheterization this within removed. Venous sheath was removed at the end of the procedure and manual pressure was applied until hemostasis was obtained. Using a back wall technique, a 6 Citizen of [place] Terumo sheath was inserted into right radial artery. Verapamil, nitroglycerin, cardene were administered through the sheath. Heparin was administered. Diagnostic 5fr pigtail, ultra, 3drc, AR2, AL0.75, MP catheters were used for diagnostic angiograms. Pigtail was used for lt ht cath, ultra was used for lt coronary angio but it was difficult to cannulate coronaries, rca was difficult to angulated anterior takeoff. Was best cannulated with waylon. At the conclusion of the procedure, a TR band was placed over the puncture site and hemostasis was obtained. There were no immediate complications. I supervised sedation from 15:15 to 16:10 with versed 2mg/fentanyl 50mcg during the procedure. An independent trained observer pushed meds at my direction.   We monitored the patient's level of consciousness and vital signs/physiologic status throughout the procedure duration (see times listed previously). 260cc contrast was utilized. <20cc EBL. FINDINGS     HEMODYNAMICS    CHAMBER PRESSURE SATURATION   RA 3 73%   RV 23/3    PA 20/11 69%   PW 9    AORTA 103/58 93%     NALDO CARDIAC OUTPUT 5.6  L/min   SVR 1081     PVR 90         Brachiocephalic tortuosity was noted. LVGRAM    LVEDP 6   GRADIENT ACROSS AORTIC VALVE None   LV FUNCTION EF 50%   WALL MOTION Grossly normal   MITRAL REGURGITATION Mild          CORONARY ARTERIES    LM Prox <10% stenosis. Mid-distal 40-50% stenosis. LAD Ostial/proximal 40 to 50% stenosis, vessel is calcified, there is ohphbwgi-fpu-oiomrh 40% stenoses. D1 is tortuous, calcified, there is proximal eccentric 60 to 75% stenosis. LCX Proximal-mid 40% stenosis with patent stent, distally into OM 2 which is a small to medium size vessel, there is 60% stenosis. RI Large vessel, calcified, kilmlbll-vff-rggwsr 50 to 60% stenosis. RCA Difficult to cannulate, anterior takeoff, there are mid-- distal stents which are widely patent with 10 to 20% stenosis, the proximal and ostial segments to have 50 to 60% stenosis. This is a dominant vessel.              CONCLUSIONS:     Moderate to borderline mod-sev distal LM disease  Moderate prox RCA stenosis with patent stents  Mod-severe D1 and ramus stenoses  Will refer to ct surgery for consideration of cabg

## 2023-09-26 NOTE — H&P
am TECHNIQUE: CT of the head was performed without the administration of intravenous contrast. Automated exposure control, iterative reconstruction, and/or weight based adjustment of the mA/kV was utilized to reduce the radiation dose to as low as reasonably achievable. COMPARISON: None. HISTORY: ORDERING SYSTEM PROVIDED HISTORY: headache TECHNOLOGIST PROVIDED HISTORY: Reason for exam:->headache Has a \"code stroke\" or \"stroke alert\" been called? ->No Decision Support Exception - unselect if not a suspected or confirmed emergency medical condition->Emergency Medical Condition (MA) Reason for Exam: lightheaded, dizzy, loss of balance , pain rt side of head FINDINGS: BRAIN/VENTRICLES: Ventricles are midline in position. No intracerebral masses are identified. No mass effect. No midline shift. No acute intracranial hemorrhage is seen. Mild to moderate periventricular hypodensity is seen. Scattered additional areas of hypodensity are seen throughout the frontal and parietal white matter. There is intracranial atherosclerosis. ORBITS: The visualized portion of the orbits demonstrate no acute abnormality. SINUSES: No significant mastoid opacification noted. There is bowing of the nasal septum. Mild mucosal thickening is seen in the ethmoid air cells. SOFT TISSUES/SKULL:  No acute abnormality of the visualized skull or soft tissues. No acute intracranial abnormality. Periventricular and scattered frontal parietal white matter disease, likely due to small-vessel ischemic change     XR CHEST (2 VW)    Result Date: 9/26/2023  EXAMINATION: TWO XRAY VIEWS OF THE CHEST 9/26/2023 7:06 am COMPARISON: None. HISTORY: ORDERING SYSTEM PROVIDED HISTORY: CP concern for EKG changes TECHNOLOGIST PROVIDED HISTORY: Reason for exam:->CP concern for EKG changes FINDINGS: AP and lateral view of the chest.  The lungs are well inflated. There is mildly increased interstitial markings throughout. Negative for lobar infiltrate.   No effusion or pneumothorax. Mild early moderate cardiac enlargement. The keisha are symmetric. Mildly calcified and tortuous aorta. Suspect small hiatal hernia. The trachea is midline. Mild degenerative changes the [facility] joint and degenerative changes throughout the spine. Mildly increased interstitial markings throughout. This is nonspecific and may represent either acute or chronic interstitial process, including early failure. Clinical correlation may be helpful in recommend short interval follow-up.        Prior

## 2023-09-26 NOTE — CONSULTS
[address] [place] [address]  [phone]      Attending Physician: Lanna Hodgkins, DO  Reason for Consultation/Chief Complaint: Chest pain    Subjective   History of Present Illness:  Alba Kaur is a 77 y.o. patient who presented to the hospital with complaints of chest pain that began over the last few days, worsened overnight to this morning patient presented emergency room, he was brought by EMS there is initial concern for anterior ST elevation although subsequent EKG showed stabilization of ST changes and he says chest pain has abated. Work-up in the emergency room revealed elevated high-sensitivity troponin level of greater than 50. Patient has also noted a headache over the last month with imbalance. His family is at the bedside and reports that he has a longstanding history of cardiac disease including 4 stents, he follows with [facility] cardiology through the [facility] system with Dr. Domonique Flores. He has had a history of 4 stents, he says he saw his cardiologist in [facility] a few weeks back to about a month back and he was felt to be stable, and it is noted from family and patient that he is also had a history of peripheral vascular disease with AAA repair a few years ago, review of records indicates that he had AAA repair with Dr. Phyllis De Jesus in 2020 and since then he says his peripheral vascular disease has been stable. He has a history of chronic tobacco abuse, he is a current smoker although he smokes less. He says he had been on Plavix but ultimately transition to [address] although he is not sure as to why. Chronically, he is also had a history of interstitial lung disease, hypertension, hypercholesterolemia, chronic kidney disease.     Past Medical History:   has a past medical history of AAA (abdominal aortic aneurysm) ([address]), CHF (congestive heart failure) ([address]), CKD (chronic kidney disease), COPD (chronic obstructive pulmonary disease) ([address]), Coronary artery nonspecific and  may represent either acute or chronic interstitial process, including early  failure. Clinical correlation may be helpful in recommend short interval  follow-up. I have reviewed labs and imaging/xray/diagnostic testing in this note. Assessment and Plan          Patient Active Problem List   Diagnosis    Acute coronary syndrome (HCC)    Coronary artery disease involving native coronary artery of native heart with angina pectoris (HCC)    Obesity    Type 2 diabetes mellitus without complication, without long-term current use of insulin (HCC)    Abdominal aortic aneurysm (AAA) without rupture (HCC)    Chronic congestive heart failure (HCC)    Chronic obstructive pulmonary disease (HCC)    Vitamin D deficiency    Mixed hyperlipidemia    Essential hypertension    AAA (abdominal aortic aneurysm) (HCC)    NSVT (nonsustained ventricular tachycardia) (HCC)       Chest pain, non-STEMI, plan on heparin and nitro drips, discussed with ER staff, patient will need heart catheterization, will obtain echocardiogram in interim plan for heart catheterization today, risk/benefit/alternatives/outcomes discussed with patient and family, they understand/agree. They understand this is a high risk situation due to MI, tobacco abuse, and comorbidities. CAD/ASHD, continue aspirin, Plavix is listed as an allergy although he is taking this in the past, may need to consider alternate agents or reinitiation of this. Continue heparin as above and plan for cath as above, he has been on Xarelto, reasons for this are not clear although this may be related to peripheral vascular disease and cardiovascular disease and we will try to get records from his primary cardiac team to further investigate prior history and medications.     Hypertension, continue beta-blocker    Hypercholesterolemia, continue statin    Heart failure, may consider additional treatment beyond ACE inhibitor and diuretics pending right and left heart

## 2023-09-27 LAB
ANION GAP SERPL CALCULATED.3IONS-SCNC: 9 MMOL/L (ref 3–16)
BUN SERPL-MCNC: 19 MG/DL (ref 7–20)
CALCIUM SERPL-MCNC: 9.4 MG/DL (ref 8.3–10.6)
CHLORIDE SERPL-SCNC: 104 MMOL/L (ref 99–110)
CO2 SERPL-SCNC: 24 MMOL/L (ref 21–32)
CREAT SERPL-MCNC: 0.9 MG/DL (ref 0.8–1.3)
DEPRECATED RDW RBC AUTO: 15.4 % (ref 12.4–15.4)
EST. AVERAGE GLUCOSE BLD GHB EST-MCNC: 128.4 MG/DL
GFR SERPLBLD CREATININE-BSD FMLA CKD-EPI: >60 ML/MIN/{1.73_M2}
GLUCOSE BLD-MCNC: 105 MG/DL (ref 70–99)
GLUCOSE BLD-MCNC: 136 MG/DL (ref 70–99)
GLUCOSE SERPL-MCNC: 106 MG/DL (ref 70–99)
HBA1C MFR BLD: 6.1 %
HCT VFR BLD AUTO: 45.1 % (ref 40.5–52.5)
HGB BLD-MCNC: 15.6 G/DL (ref 13.5–17.5)
MCH RBC QN AUTO: 29.6 PG (ref 26–34)
MCHC RBC AUTO-ENTMCNC: 34.6 G/DL (ref 31–36)
MCV RBC AUTO: 85.5 FL (ref 80–100)
PERFORMED ON: ABNORMAL
PERFORMED ON: ABNORMAL
PLATELET # BLD AUTO: 190 K/UL (ref 135–450)
PMV BLD AUTO: 8.7 FL (ref 5–10.5)
POC ACT LR: 167 SEC
POTASSIUM SERPL-SCNC: 4.1 MMOL/L (ref 3.5–5.1)
RBC # BLD AUTO: 5.27 M/UL (ref 4.2–5.9)
SODIUM SERPL-SCNC: 137 MMOL/L (ref 136–145)
WBC # BLD AUTO: 9.2 K/UL (ref 4–11)

## 2023-09-27 PROCEDURE — 99232 SBSQ HOSP IP/OBS MODERATE 35: CPT | Performed by: NURSE PRACTITIONER

## 2023-09-27 PROCEDURE — 80048 BASIC METABOLIC PNL TOTAL CA: CPT

## 2023-09-27 PROCEDURE — 2060000000 HC ICU INTERMEDIATE R&B

## 2023-09-27 PROCEDURE — 85027 COMPLETE CBC AUTOMATED: CPT

## 2023-09-27 PROCEDURE — 36415 COLL VENOUS BLD VENIPUNCTURE: CPT

## 2023-09-27 PROCEDURE — 6370000000 HC RX 637 (ALT 250 FOR IP): Performed by: INTERNAL MEDICINE

## 2023-09-27 PROCEDURE — 2580000003 HC RX 258: Performed by: INTERNAL MEDICINE

## 2023-09-27 RX ORDER — RIVAROXABAN 2.5 MG/1
2.5 TABLET, FILM COATED ORAL 2 TIMES DAILY
Qty: 60 TABLET | Refills: 0
Start: 2023-09-27

## 2023-09-27 RX ORDER — ENOXAPARIN SODIUM 100 MG/ML
40 INJECTION SUBCUTANEOUS DAILY
Status: DISCONTINUED | OUTPATIENT
Start: 2023-09-28 | End: 2023-09-28 | Stop reason: HOSPADM

## 2023-09-27 RX ORDER — ATORVASTATIN CALCIUM 80 MG/1
80 TABLET, FILM COATED ORAL NIGHTLY
Qty: 30 TABLET | Refills: 3
Start: 2023-09-27

## 2023-09-27 RX ADMIN — SPIRONOLACTONE 12.5 MG: 25 TABLET ORAL at 08:44

## 2023-09-27 RX ADMIN — METOPROLOL TARTRATE 50 MG: 50 TABLET, FILM COATED ORAL at 20:58

## 2023-09-27 RX ADMIN — Medication 400 MG: at 20:58

## 2023-09-27 RX ADMIN — METOPROLOL TARTRATE 50 MG: 50 TABLET, FILM COATED ORAL at 08:44

## 2023-09-27 RX ADMIN — ASPIRIN 81 MG 81 MG: 81 TABLET ORAL at 08:44

## 2023-09-27 RX ADMIN — LISINOPRIL 10 MG: 10 TABLET ORAL at 08:44

## 2023-09-27 RX ADMIN — ATORVASTATIN CALCIUM 80 MG: 80 TABLET, FILM COATED ORAL at 20:58

## 2023-09-27 RX ADMIN — Medication 10 ML: at 20:57

## 2023-09-27 RX ADMIN — Medication 400 MG: at 08:44

## 2023-09-27 ASSESSMENT — PAIN SCALES - GENERAL: PAINLEVEL_OUTOF10: 0

## 2023-09-27 NOTE — PROGRESS NOTES
4 Eyes Skin Assessment     The patient is being assess for  Admission    I agree that 2 RN's have performed a thorough Head to Toe Skin Assessment on the patient. ALL assessment sites listed below have been assessed. Areas assessed by both nurses: Omid Hood RN  [x]   Head, Face, and Ears   [x]   Shoulders, Back, and Chest  [x]   Arms, Elbows, and Hands   [x]   Coccyx, Sacrum, and IschIum  [x]   Legs, Feet, and Heels        Does the Patient have Skin Breakdown?   No         Jarek Prevention initiated:  No   Wound Care Orders initiated:  No      [facility] nurse consulted for Pressure Injury (Stage 3,4, Unstageable, DTI, NWPT, and Complex wounds), New and Established Ostomies:  No      Nurse 1 eSignature: Electronically signed by Kelly Ewing RN on 9/27/23 at 1:04 AM EDT    **SHARE this note so that the co-signing nurse is able to place an eSignature**    Nurse 2 eSignature: Electronically signed by Annika Olivarez RN on 9/27/23 at 1:25 AM EDT

## 2023-09-27 NOTE — PROGRESS NOTES
Pt assessment completed and charted. VSS. Pt a/o x4. TR band removed at 945. Armboard in place. Pt is standby on ambulation. Bed alarm on. Bed in lowest position and wheels locked. Call light within reach. Bedside table within reach. Non-skid footwear in place. Pt denies any other needs at this time. Pt calls out appropriately.

## 2023-09-27 NOTE — PROGRESS NOTES
Transfer center called charge nurse to help expedite imaging being sent to [facility] for transfer to the other facility. Imaging requested was cath imaging, echo, and any CT scans. Spoke with Radiology who was able to send over the most recent CT and chest xray but the tech was unable to access the cath and echo images. Cath lab able to send over cath and echo images.

## 2023-09-27 NOTE — CARE COORDINATION
Patient needs CABG and wants to be transferred to [facility] for surgery. MD initiated transfer at transfer center and [facility] accepted but is discharge dependent for beds. The transfer center will notify hospital when they have a bed and can accept.

## 2023-09-27 NOTE — CARE COORDINATION
Called transfer center to check status of the bed at [facility]. There is an accepting doctor but still no roya. They are hopeful a bed may open up later today.

## 2023-09-27 NOTE — PROGRESS NOTES
Called transfer center, accepted at [facility] under CTS.  Currently discharge dependent and anticipate discharge 9/27-9/28

## 2023-09-28 VITALS
OXYGEN SATURATION: 96 % | WEIGHT: 206.9 LBS | HEART RATE: 67 BPM | SYSTOLIC BLOOD PRESSURE: 123 MMHG | HEIGHT: 72 IN | BODY MASS INDEX: 28.02 KG/M2 | DIASTOLIC BLOOD PRESSURE: 82 MMHG | TEMPERATURE: 97.9 F | RESPIRATION RATE: 16 BRPM

## 2023-09-28 PROBLEM — I10 PRIMARY HYPERTENSION: Status: ACTIVE | Noted: 2023-09-28

## 2023-09-28 PROBLEM — Z72.0 TOBACCO USE: Status: ACTIVE | Noted: 2023-09-28

## 2023-09-28 LAB
GLUCOSE BLD-MCNC: 137 MG/DL (ref 70–99)
GLUCOSE BLD-MCNC: 148 MG/DL (ref 70–99)
PERFORMED ON: ABNORMAL
PERFORMED ON: ABNORMAL

## 2023-09-28 PROCEDURE — 2580000003 HC RX 258: Performed by: INTERNAL MEDICINE

## 2023-09-28 PROCEDURE — 6360000002 HC RX W HCPCS: Performed by: FAMILY MEDICINE

## 2023-09-28 PROCEDURE — 99232 SBSQ HOSP IP/OBS MODERATE 35: CPT

## 2023-09-28 PROCEDURE — 99232 SBSQ HOSP IP/OBS MODERATE 35: CPT | Performed by: NURSE PRACTITIONER

## 2023-09-28 PROCEDURE — 6370000000 HC RX 637 (ALT 250 FOR IP): Performed by: INTERNAL MEDICINE

## 2023-09-28 RX ADMIN — Medication 400 MG: at 08:30

## 2023-09-28 RX ADMIN — Medication 10 ML: at 08:31

## 2023-09-28 RX ADMIN — ENOXAPARIN SODIUM 40 MG: 100 INJECTION SUBCUTANEOUS at 08:30

## 2023-09-28 RX ADMIN — ASPIRIN 81 MG 81 MG: 81 TABLET ORAL at 08:31

## 2023-09-28 RX ADMIN — LISINOPRIL 10 MG: 10 TABLET ORAL at 08:30

## 2023-09-28 RX ADMIN — SPIRONOLACTONE 12.5 MG: 25 TABLET ORAL at 08:31

## 2023-09-28 RX ADMIN — METOPROLOL TARTRATE 50 MG: 50 TABLET, FILM COATED ORAL at 08:30

## 2023-09-28 NOTE — PROGRESS NOTES
5-40 mL IntraVENous 2 times per day    insulin lispro  0-4 Units SubCUTAneous TID WC    insulin lispro  0-4 Units SubCUTAneous Nightly    nicotine  1 patch TransDERmal Daily    aspirin  325 mg Oral Once      Infusions:    nitroGLYCERIN Stopped (09/26/23 1503)    dextrose      sodium chloride       PRN Meds: heparin (porcine), 4,000 Units, PRN  heparin (porcine), 2,000 Units, PRN  perflutren lipid microspheres, 1.5 mL, ONCE PRN  glucose, 4 tablet, PRN  dextrose bolus, 125 mL, PRN   Or  dextrose bolus, 250 mL, PRN  glucagon (rDNA), 1 mg, PRN  dextrose, , Continuous PRN  sodium chloride flush, 5-40 mL, PRN  sodium chloride, , PRN  polyethylene glycol, 17 g, Daily PRN  acetaminophen, 650 mg, Q6H PRN   Or  acetaminophen, 650 mg, Q6H PRN  prochlorperazine, 10 mg, Q6H PRN  ondansetron, 4 mg, Q6H PRN        Labs and Imaging   CT HEAD WO CONTRAST    Result Date: 9/26/2023  EXAMINATION: CT OF THE HEAD WITHOUT CONTRAST  9/26/2023 8:35 am TECHNIQUE: CT of the head was performed without the administration of intravenous contrast. Automated exposure control, iterative reconstruction, and/or weight based adjustment of the mA/kV was utilized to reduce the radiation dose to as low as reasonably achievable. COMPARISON: None. HISTORY: ORDERING SYSTEM PROVIDED HISTORY: headache TECHNOLOGIST PROVIDED HISTORY: Reason for exam:->headache Has a \"code stroke\" or \"stroke alert\" been called? ->No Decision Support Exception - unselect if not a suspected or confirmed emergency medical condition->Emergency Medical Condition (MA) Reason for Exam: lightheaded, dizzy, loss of balance , pain rt side of head FINDINGS: BRAIN/VENTRICLES: Ventricles are midline in position. No intracerebral masses are identified. No mass effect. No midline shift. No acute intracranial hemorrhage is seen. Mild to moderate periventricular hypodensity is seen. Scattered additional areas of hypodensity are seen throughout the frontal and parietal white matter.  There is PM     Hemoglobin A1C:   Lab Results   Component Value Date/Time    LABA1C 6.1 09/26/2023 07:02 AM     TSH: No results found for: \"TSH\"  Troponin: No results found for: \"TROPONINT\"  Lactic Acid: No results for input(s): \"LACTA\" in the last 72 hours. BNP: No results for input(s): \"PROBNP\" in the last 72 hours.   UA:No results found for: \"NITRU\", \"COLORU\", \"PHUR\", \"LABCAST\", \"WBCUA\", \"RBCUA\", \"MUCUS\", \"TRICHOMONAS\", \"YEAST\", \"BACTERIA\", \"CLARITYU\", \"SPECGRAV\", \"LEUKOCYTESUR\", \"UROBILINOGEN\", \"BILIRUBINUR\", \"BLOODU\", \"GLUCOSEU\", \"KETUA\", \"AMORPHOUS\"  Urine Cultures: No results found for: \"LABURIN\"  Blood Cultures: No results found for: \"BC\"  No results found for: \"BLOODCULT2\"  Organism: No results found for: \"ORG\"    55 minutes spent with patient including counseling and family discussions, discussing with cardiology at [facility] and [facility] signed by Elvira [place] MD on 9/27/2023 at 9:40 PM

## 2023-09-28 NOTE — CARE COORDINATION
[facility] transfer center called this AM by [employer] and still no bed available . They will notify hospital once there is a bed. There is an accepting MD . Patient needs CABG and prefers to go to [facility].

## 2023-09-28 NOTE — PROGRESS NOTES
Pt discharged, pt transported via stretcher and accompanied by EMS. Report given to EMS. All of pt personal belongings sent with pt. Pt L AC peripheral IV in tact. Pt after visit summary sent with EMS. Pt daughter at bedside and aware of transfer.

## (undated) DEVICE — LINE INJ L48IN OD0.14IN ID0.07IN 1200PSI BRAID POLYUR FEM

## (undated) DEVICE — Device

## (undated) DEVICE — 3M™ TEGADERM™ TRANSPARENT FILM DRESSING FRAME STYLE, 1624W, 2-3/8 IN X 2-3/4 IN (6 CM X 7 CM), 100/CT 4CT/CASE: Brand: 3M™ TEGADERM™

## (undated) DEVICE — SNAP KOVER: Brand: UNBRANDED

## (undated) DEVICE — SHEATH INTRO 18FR L33CM OD6.7MM ID6MM HYDRPHLC KINK

## (undated) DEVICE — CATHETER ANGIO AD 5FR L100CM DIA0.052IN 0.035IN 20 BND PIG

## (undated) DEVICE — SHEATH INTRO 12FR L33CM OD4.7MM ID4MM HYDRPHLC KINK

## (undated) DEVICE — 3M™ STERI-STRIP™ COMPOUND BENZOIN TINCTURE 40 BAGS/CARTON 4 CARTONS/CASE C1544: Brand: 3M™ STERI-STRIP™

## (undated) DEVICE — 35 ML SYRINGE LUER-LOCK TIP: Brand: MONOJECT

## (undated) DEVICE — 3M™ STERI-STRIP™ REINFORCED ADHESIVE SKIN CLOSURES, R1547, 1/2 IN X 4 IN (12 MM X 100 MM), 6 STRIPS/ENVELOPE: Brand: 3M™ STERI-STRIP™

## (undated) DEVICE — DRAPE,LAP,CHOLE,W/TROUGHS,STERILE: Brand: MEDLINE

## (undated) DEVICE — RADIFOCUS GLIDEWIRE: Brand: GLIDEWIRE

## (undated) DEVICE — COVER,TABLE,44X90,STERILE: Brand: MEDLINE

## (undated) DEVICE — BOWL MED L 32OZ PLAS W/ MOLD GRAD EZ OPN PEEL PCH

## (undated) DEVICE — GOWN SIRUS NONREIN XL W/TWL: Brand: MEDLINE INDUSTRIES, INC.

## (undated) DEVICE — 3M™ IOBAN™ 2 ANTIMICROBIAL INCISE DRAPE 6651EZ: Brand: IOBAN™ 2

## (undated) DEVICE — 20 ML SYRINGE LUER-LOCK TIP: Brand: MONOJECT

## (undated) DEVICE — PINNACLE INTRODUCER SHEATH: Brand: PINNACLE

## (undated) DEVICE — PROVE COVER: Brand: UNBRANDED

## (undated) DEVICE — GOWN SIRUS NONREIN LG W/TWL: Brand: MEDLINE INDUSTRIES, INC.

## (undated) DEVICE — SNAP KAP: Brand: UNBRANDED

## (undated) DEVICE — RADIFOCUS GLIDECATH: Brand: GLIDECATH

## (undated) DEVICE — CATHETER PTCA BLLN L4CM INFL 10-37MM CATH L90CM MOLDING

## (undated) DEVICE — GAUZE,SPONGE,4"X4",16PLY,XRAY,STRL,LF: Brand: MEDLINE

## (undated) DEVICE — STOPCOCK ANGIO 1050PSI DK BLU R ROT M LUER 3 W OFF HNDL

## (undated) DEVICE — SUTURE MCRYL + SZ 4-0 L18IN ABSRB UD L19MM PS-2 3/8 CIR MCP496G

## (undated) DEVICE — GLOVE SURG SZ 8 L11.77IN FNGR THK9.8MIL STRW LTX POLYMER

## (undated) DEVICE — DILATOR: Brand: COOK

## (undated) DEVICE — TOWEL,OR,DSP,ST,BLUE,STD,6/PK,12PK/CS: Brand: MEDLINE

## (undated) DEVICE — SYRINGE ANGIO 12ML COR CTRL ROT ADPT SLD PLUNG CLR BRL M

## (undated) DEVICE — Z DUP USE 2564940 SYRINGE MED CLR POLYCARB BRL DISP MRK 7 ARTERION MEDRAD

## (undated) DEVICE — GUIDEWIRE VASC L260CM DIA0.035IN L15CM STR TIP PTFE S STL

## (undated) DEVICE — TTL1LYR 16FR10ML 100%SIL TMPST TR: Brand: MEDLINE

## (undated) DEVICE — RADIFOCUS TORQUE DEVICE MULTI-TORQUE VISE: Brand: RADIFOCUS TORQUE DEVICE

## (undated) DEVICE — GAUZE,SPONGE,2"X2",8PLY,STERILE,LF,2'S: Brand: MEDLINE